# Patient Record
Sex: FEMALE | Race: WHITE | NOT HISPANIC OR LATINO | URBAN - METROPOLITAN AREA
[De-identification: names, ages, dates, MRNs, and addresses within clinical notes are randomized per-mention and may not be internally consistent; named-entity substitution may affect disease eponyms.]

---

## 2021-09-27 ENCOUNTER — INPATIENT (INPATIENT)
Facility: HOSPITAL | Age: 56
LOS: 5 days | Discharge: HOME | DRG: 189 | End: 2021-10-03
Attending: HOSPITALIST | Admitting: HOSPITALIST
Payer: COMMERCIAL

## 2021-09-27 VITALS
SYSTOLIC BLOOD PRESSURE: 116 MMHG | TEMPERATURE: 99 F | OXYGEN SATURATION: 98 % | HEART RATE: 94 BPM | RESPIRATION RATE: 18 BRPM | DIASTOLIC BLOOD PRESSURE: 74 MMHG

## 2021-09-27 DIAGNOSIS — F41.9 ANXIETY DISORDER, UNSPECIFIED: ICD-10-CM

## 2021-09-27 DIAGNOSIS — J45.909 UNSPECIFIED ASTHMA, UNCOMPLICATED: ICD-10-CM

## 2021-09-27 LAB
ALBUMIN SERPL ELPH-MCNC: 3.8 G/DL — SIGNIFICANT CHANGE UP (ref 3.4–5)
ALP SERPL-CCNC: 83 U/L — SIGNIFICANT CHANGE UP (ref 40–120)
ALT FLD-CCNC: 37 U/L — SIGNIFICANT CHANGE UP (ref 12–42)
ANION GAP SERPL CALC-SCNC: 15 MMOL/L — SIGNIFICANT CHANGE UP (ref 9–16)
AST SERPL-CCNC: 32 U/L — SIGNIFICANT CHANGE UP (ref 15–37)
BASOPHILS # BLD AUTO: 0 K/UL — SIGNIFICANT CHANGE UP (ref 0–0.2)
BASOPHILS NFR BLD AUTO: 0 % — SIGNIFICANT CHANGE UP (ref 0–2)
BILIRUB SERPL-MCNC: 0.3 MG/DL — SIGNIFICANT CHANGE UP (ref 0.2–1.2)
BUN SERPL-MCNC: 16 MG/DL — SIGNIFICANT CHANGE UP (ref 7–23)
CALCIUM SERPL-MCNC: 9.1 MG/DL — SIGNIFICANT CHANGE UP (ref 8.5–10.5)
CHLORIDE SERPL-SCNC: 104 MMOL/L — SIGNIFICANT CHANGE UP (ref 96–108)
CO2 SERPL-SCNC: 22 MMOL/L — SIGNIFICANT CHANGE UP (ref 22–31)
CREAT SERPL-MCNC: 0.9 MG/DL — SIGNIFICANT CHANGE UP (ref 0.5–1.3)
EOSINOPHIL # BLD AUTO: 0 K/UL — SIGNIFICANT CHANGE UP (ref 0–0.5)
EOSINOPHIL NFR BLD AUTO: 0 % — SIGNIFICANT CHANGE UP (ref 0–6)
GLUCOSE BLDC GLUCOMTR-MCNC: 118 MG/DL — HIGH (ref 70–99)
GLUCOSE SERPL-MCNC: 204 MG/DL — HIGH (ref 70–99)
HCT VFR BLD CALC: 40 % — SIGNIFICANT CHANGE UP (ref 34.5–45)
HGB BLD-MCNC: 12.8 G/DL — SIGNIFICANT CHANGE UP (ref 11.5–15.5)
LYMPHOCYTES # BLD AUTO: 0.13 K/UL — LOW (ref 1–3.3)
LYMPHOCYTES # BLD AUTO: 1 % — LOW (ref 13–44)
MANUAL SMEAR VERIFICATION: SIGNIFICANT CHANGE UP
MCHC RBC-ENTMCNC: 28.8 PG — SIGNIFICANT CHANGE UP (ref 27–34)
MCHC RBC-ENTMCNC: 32 GM/DL — SIGNIFICANT CHANGE UP (ref 32–36)
MCV RBC AUTO: 90.1 FL — SIGNIFICANT CHANGE UP (ref 80–100)
MONOCYTES # BLD AUTO: 0.26 K/UL — SIGNIFICANT CHANGE UP (ref 0–0.9)
MONOCYTES NFR BLD AUTO: 2 % — SIGNIFICANT CHANGE UP (ref 2–14)
NEUTROPHILS # BLD AUTO: 12.66 K/UL — HIGH (ref 1.8–7.4)
NEUTROPHILS NFR BLD AUTO: 93 % — HIGH (ref 43–77)
NEUTS BAND # BLD: 4 % — SIGNIFICANT CHANGE UP (ref 0–8)
NRBC # BLD: 0 /100 — SIGNIFICANT CHANGE UP (ref 0–0)
NRBC # BLD: SIGNIFICANT CHANGE UP /100 WBCS (ref 0–0)
PLAT MORPH BLD: NORMAL — SIGNIFICANT CHANGE UP
PLATELET # BLD AUTO: 323 K/UL — SIGNIFICANT CHANGE UP (ref 150–400)
POTASSIUM SERPL-MCNC: 3.8 MMOL/L — SIGNIFICANT CHANGE UP (ref 3.5–5.3)
POTASSIUM SERPL-SCNC: 3.8 MMOL/L — SIGNIFICANT CHANGE UP (ref 3.5–5.3)
PROT SERPL-MCNC: 7.6 G/DL — SIGNIFICANT CHANGE UP (ref 6.4–8.2)
RBC # BLD: 4.44 M/UL — SIGNIFICANT CHANGE UP (ref 3.8–5.2)
RBC # FLD: 13.8 % — SIGNIFICANT CHANGE UP (ref 10.3–14.5)
RBC BLD AUTO: NORMAL — SIGNIFICANT CHANGE UP
SARS-COV-2 RNA SPEC QL NAA+PROBE: SIGNIFICANT CHANGE UP
SODIUM SERPL-SCNC: 141 MMOL/L — SIGNIFICANT CHANGE UP (ref 132–145)
WBC # BLD: 13.05 K/UL — HIGH (ref 3.8–10.5)
WBC # FLD AUTO: 13.05 K/UL — HIGH (ref 3.8–10.5)

## 2021-09-27 PROCEDURE — 99053 MED SERV 10PM-8AM 24 HR FAC: CPT

## 2021-09-27 PROCEDURE — 99285 EMERGENCY DEPT VISIT HI MDM: CPT

## 2021-09-27 PROCEDURE — 99222 1ST HOSP IP/OBS MODERATE 55: CPT | Mod: GC

## 2021-09-27 PROCEDURE — 71045 X-RAY EXAM CHEST 1 VIEW: CPT | Mod: 26

## 2021-09-27 RX ORDER — INFLUENZA VIRUS VACCINE 15; 15; 15; 15 UG/.5ML; UG/.5ML; UG/.5ML; UG/.5ML
0.5 SUSPENSION INTRAMUSCULAR ONCE
Refills: 0 | Status: DISCONTINUED | OUTPATIENT
Start: 2021-09-27 | End: 2021-10-03

## 2021-09-27 RX ORDER — ALBUTEROL 90 UG/1
2.5 AEROSOL, METERED ORAL
Refills: 0 | Status: COMPLETED | OUTPATIENT
Start: 2021-09-27 | End: 2021-09-27

## 2021-09-27 RX ORDER — SODIUM CHLORIDE 9 MG/ML
1000 INJECTION, SOLUTION INTRAVENOUS
Refills: 0 | Status: DISCONTINUED | OUTPATIENT
Start: 2021-09-27 | End: 2021-10-03

## 2021-09-27 RX ORDER — GLUCAGON INJECTION, SOLUTION 0.5 MG/.1ML
1 INJECTION, SOLUTION SUBCUTANEOUS ONCE
Refills: 0 | Status: DISCONTINUED | OUTPATIENT
Start: 2021-09-27 | End: 2021-10-03

## 2021-09-27 RX ORDER — IPRATROPIUM/ALBUTEROL SULFATE 18-103MCG
3 AEROSOL WITH ADAPTER (GRAM) INHALATION EVERY 4 HOURS
Refills: 0 | Status: DISCONTINUED | OUTPATIENT
Start: 2021-09-27 | End: 2021-09-29

## 2021-09-27 RX ORDER — IPRATROPIUM/ALBUTEROL SULFATE 18-103MCG
3 AEROSOL WITH ADAPTER (GRAM) INHALATION ONCE
Refills: 0 | Status: COMPLETED | OUTPATIENT
Start: 2021-09-27 | End: 2021-09-27

## 2021-09-27 RX ORDER — LANOLIN ALCOHOL/MO/W.PET/CERES
3 CREAM (GRAM) TOPICAL AT BEDTIME
Refills: 0 | Status: DISCONTINUED | OUTPATIENT
Start: 2021-09-27 | End: 2021-10-03

## 2021-09-27 RX ORDER — BUDESONIDE AND FORMOTEROL FUMARATE DIHYDRATE 160; 4.5 UG/1; UG/1
2 AEROSOL RESPIRATORY (INHALATION)
Refills: 0 | Status: DISCONTINUED | OUTPATIENT
Start: 2021-09-27 | End: 2021-09-28

## 2021-09-27 RX ORDER — DEXTROSE 50 % IN WATER 50 %
12.5 SYRINGE (ML) INTRAVENOUS ONCE
Refills: 0 | Status: DISCONTINUED | OUTPATIENT
Start: 2021-09-27 | End: 2021-10-03

## 2021-09-27 RX ORDER — ENOXAPARIN SODIUM 100 MG/ML
40 INJECTION SUBCUTANEOUS EVERY 24 HOURS
Refills: 0 | Status: DISCONTINUED | OUTPATIENT
Start: 2021-09-27 | End: 2021-10-03

## 2021-09-27 RX ORDER — DEXTROSE 50 % IN WATER 50 %
25 SYRINGE (ML) INTRAVENOUS ONCE
Refills: 0 | Status: DISCONTINUED | OUTPATIENT
Start: 2021-09-27 | End: 2021-10-03

## 2021-09-27 RX ORDER — POLYETHYLENE GLYCOL 3350 17 G/17G
17 POWDER, FOR SOLUTION ORAL EVERY 24 HOURS
Refills: 0 | Status: DISCONTINUED | OUTPATIENT
Start: 2021-09-27 | End: 2021-09-30

## 2021-09-27 RX ORDER — DEXTROSE 50 % IN WATER 50 %
15 SYRINGE (ML) INTRAVENOUS ONCE
Refills: 0 | Status: DISCONTINUED | OUTPATIENT
Start: 2021-09-27 | End: 2021-10-03

## 2021-09-27 RX ORDER — ACETAMINOPHEN 500 MG
650 TABLET ORAL EVERY 6 HOURS
Refills: 0 | Status: DISCONTINUED | OUTPATIENT
Start: 2021-09-27 | End: 2021-10-03

## 2021-09-27 RX ORDER — IPRATROPIUM/ALBUTEROL SULFATE 18-103MCG
3 AEROSOL WITH ADAPTER (GRAM) INHALATION EVERY 6 HOURS
Refills: 0 | Status: DISCONTINUED | OUTPATIENT
Start: 2021-09-27 | End: 2021-09-27

## 2021-09-27 RX ORDER — INSULIN LISPRO 100/ML
VIAL (ML) SUBCUTANEOUS
Refills: 0 | Status: DISCONTINUED | OUTPATIENT
Start: 2021-09-27 | End: 2021-10-03

## 2021-09-27 RX ORDER — SENNA PLUS 8.6 MG/1
2 TABLET ORAL AT BEDTIME
Refills: 0 | Status: DISCONTINUED | OUTPATIENT
Start: 2021-09-27 | End: 2021-09-30

## 2021-09-27 RX ADMIN — ALBUTEROL 2.5 MILLIGRAM(S): 90 AEROSOL, METERED ORAL at 03:08

## 2021-09-27 RX ADMIN — ENOXAPARIN SODIUM 40 MILLIGRAM(S): 100 INJECTION SUBCUTANEOUS at 14:28

## 2021-09-27 RX ADMIN — Medication 125 MILLIGRAM(S): at 07:09

## 2021-09-27 RX ADMIN — Medication 5 MILLIGRAM(S): at 14:29

## 2021-09-27 RX ADMIN — ALBUTEROL 2.5 MILLIGRAM(S): 90 AEROSOL, METERED ORAL at 03:49

## 2021-09-27 RX ADMIN — Medication 3 MILLILITER(S): at 17:48

## 2021-09-27 RX ADMIN — ALBUTEROL 2.5 MILLIGRAM(S): 90 AEROSOL, METERED ORAL at 07:09

## 2021-09-27 RX ADMIN — Medication 3 MILLILITER(S): at 14:02

## 2021-09-27 RX ADMIN — ALBUTEROL 2.5 MILLIGRAM(S): 90 AEROSOL, METERED ORAL at 04:29

## 2021-09-27 RX ADMIN — Medication 3 MILLILITER(S): at 22:25

## 2021-09-27 RX ADMIN — Medication 60 MILLIGRAM(S): at 17:48

## 2021-09-27 RX ADMIN — Medication 3 MILLILITER(S): at 10:22

## 2021-09-27 RX ADMIN — ALBUTEROL 2.5 MILLIGRAM(S): 90 AEROSOL, METERED ORAL at 01:15

## 2021-09-27 RX ADMIN — ALBUTEROL 2.5 MILLIGRAM(S): 90 AEROSOL, METERED ORAL at 02:56

## 2021-09-27 RX ADMIN — BUDESONIDE AND FORMOTEROL FUMARATE DIHYDRATE 2 PUFF(S): 160; 4.5 AEROSOL RESPIRATORY (INHALATION) at 17:48

## 2021-09-27 RX ADMIN — Medication 3 MILLIGRAM(S): at 22:25

## 2021-09-27 RX ADMIN — ALBUTEROL 2.5 MILLIGRAM(S): 90 AEROSOL, METERED ORAL at 02:54

## 2021-09-27 RX ADMIN — ALBUTEROL 2.5 MILLIGRAM(S): 90 AEROSOL, METERED ORAL at 06:56

## 2021-09-27 RX ADMIN — ALBUTEROL 2.5 MILLIGRAM(S): 90 AEROSOL, METERED ORAL at 05:40

## 2021-09-27 NOTE — H&P ADULT - PROBLEM SELECTOR PLAN 1
One week hx of SOB and cough in the setting of recent exposure to feathers. Pt endorses most of her asthma is exacerbated by allergies. Asthma since childhood. Last hospitalization for asthma 10 years ago w/ no hx intubation. Home meds albuterol nebs, advair 500  - Prednisone 40mg for 5 days   - c/w advair 500   - c/w duoneb q6hr  - currently on 5L NC sat 96%  - continue to monitor respiratory status One week hx of SOB and cough in the setting of recent exposure to feathers. Pt endorses most of her asthma is exacerbated by allergies. Asthma since childhood. Last hospitalization for asthma 10 years ago w/ no hx intubation. Home meds albuterol nebs, advair 500.   - Prednisone 40mg for 5 days   - c/w symbicort    - c/w duoneb q6hr  - currently on 5L NC sat 96%  - continue to monitor respiratory status One week hx of SOB and cough in the setting of recent exposure to feathers. Pt endorses most of her asthma is exacerbated by allergies. Asthma since childhood. Last hospitalization for asthma 10 years ago w/ no hx intubation. Home meds albuterol roque advair 500.   - Prednisone 60mg for 5 days   - c/w symbicort    - c/w duoneb q4hr  - currently on 5L NC sat 96%  - continue to monitor respiratory status

## 2021-09-27 NOTE — ED ADULT TRIAGE NOTE - CHIEF COMPLAINT QUOTE
pt presents with shortness of breath, progressively worse over the past several days. given epi, 2 combivents, 12mg of dex, and 2mg mag en route. pt wheezing on arrival.

## 2021-09-27 NOTE — ED ADULT NURSE REASSESSMENT NOTE - NS ED NURSE REASSESS COMMENT FT1
called [facility] to give report. no answer. called ADN office and spoke to Fer who stated he will call the floor and leave a message telling them to call me for report.
pt remains wheezing s/p 3 albuterol tx, o2 sat 87% while at rest, Placed on 2L Nc , pt breathing unlabored and remains comfortable, RUTH Carlin made aware.
received pt from night shift rn. pt noted on stretcher awake, respirations even and unlabored. pt alert and oriented x3. no complaints at this time pt noted on NRB. switched pt over to 3L nasal canula and pt oxygen saturation 97%. lab work drawn and covid swab done, as per md orders. pt awaiting bed at [facility]. call bell in reach and remains on continuos pulse ox.
Ambulated with patient with SpO2 and O2 dropping to 92% on RA. Pt reports feeling shortness of breath. Pt assisted back into bed and given nebulizer treatment with resulting O2 saturation of 96%.

## 2021-09-27 NOTE — H&P ADULT - NSHPLABSRESULTS_GEN_ALL_CORE
.  LABS:                         12.8   13.05 )-----------( 323      ( 27 Sep 2021 08:24 )             40.0     09-27    141  |  104  |  16  ----------------------------<  204<H>  3.8   |  22  |  0.90    Ca    9.1      27 Sep 2021 08:24    TPro  7.6  /  Alb  3.8  /  TBili  0.3  /  DBili  x   /  AST  32  /  ALT  37  /  AlkPhos  83  09-27                  RADIOLOGY, EKG & ADDITIONAL TESTS: Reviewed.

## 2021-09-27 NOTE — H&P ADULT - NSHPPHYSICALEXAM_GEN_ALL_CORE
.  VITAL SIGNS:  T(C): 36.9 (09-27-21 @ 09:36), Max: 37.2 (09-27-21 @ 00:26)  T(F): 98.5 (09-27-21 @ 09:36), Max: 98.9 (09-27-21 @ 00:26)  HR: 116 (09-27-21 @ 12:10) (94 - 126)  BP: 124/58 (09-27-21 @ 12:10) (108/62 - 124/58)  BP(mean): 83 (09-27-21 @ 12:10) (83 - 83)  RR: 20 (09-27-21 @ 12:10) (18 - 22)  SpO2: 96% (09-27-21 @ 12:10) (92% - 98%)  Wt(kg): --    PHYSICAL EXAM:    Constitutional: WDWN resting comfortably in bed; NAD  Head: NC/AT  Eyes: PERRL, EOMI, anicteric sclera  ENT: no nasal discharge; uvula midline, no oropharyngeal erythema or exudates; MMM  Neck: supple; no JVD or thyromegaly  Respiratory: diffuse wheezing B/L; no W/R/R, no retractions  Cardiac: +S1/S2; RRR; no M/R/G; PMI non-displaced  Gastrointestinal: soft, NT/ND; +BSx4  Extremities: WWP, no clubbing or cyanosis; no peripheral edema  Vascular: 2+ radial, DP/PT pulses B/L  Dermatologic: skin warm, dry and intact;  Neurologic: AAOx3; moving all extremities, follows commands

## 2021-09-27 NOTE — H&P ADULT - HISTORY OF PRESENT ILLNESS
Pt is a 56 year old female with a past medical history of asthma (last hospitalization 10 years ago, no hx if intubation) and anxiety, presenting with an 7 day hx of shortness of breath with associated cough. Pt reports her asthma triggered by allergies and that 8 days ago she traveled for work and slept at a hotel with feather pillows. Since then she has had shortness of breath with assc dry cough Pt was taking advair as usual and her duoneb up to 5 times a day with no relief. Pt finally came to ED because the shortness of breath was so bad she felt like was going to pass out.  Pt is a 56 year old female with a past medical history of asthma (last hospitalization 10 years ago, no hx if intubation) and anxiety, presenting with an 7 day hx of shortness of breath with associated cough. Pt reports her asthma triggered by allergies and that 8 days ago she traveled for work and slept at a hotel with feather pillows. Since then she has had shortness of breath with assc dry cough Pt was taking advair as usual and her duoneb up to 5 times a day with no relief. Pt finally came to ED because the shortness of breath was so bad she felt like was going to pass out.     ED course  Vitals:  Labs:  Imaging:  Interventions:  Pt is a 56 year old female with a past medical history of asthma (last hospitalization 10 years ago, no hx if intubation) and anxiety, presenting with an 7 day hx of shortness of breath with associated cough. Pt reports her asthma triggered by allergies and that 8 days ago she traveled for work and slept at a hotel with feather pillows. Since then she has had shortness of breath with assc dry cough Pt was taking advair as usual and her duoneb up to 5 times a day with no relief. Pt finally came to ED because the shortness of breath was so bad she felt like was going to pass out.     ED course  Vitals: 98.5F, , 123/55, RR 20, 96% on 3LNC  Labs: WBC 13, glucose 204, covid negative  Imaging: CXR revealing hyperinflation   Interventions: Albuterol duoneb  Pt is a 56 year old female with a past medical history of asthma (last hospitalization 10 years ago, no hx if intubation) and anxiety, presenting with an 7 day hx of shortness of breath with associated cough. Pt reports her asthma triggered by allergies and that 8 days ago she traveled for work and slept at a hotel with feather pillows. Since then she has had shortness of breath with assc dry cough Pt was taking advair as usual and her duoneb up to 5 times a day with no relief. Pt finally came to ED because the shortness of breath was so bad she felt like was going to pass out.     ED course  Vitals: 98.5F, , 123/55, RR 20, 96% on 3LNC  Labs: WBC 13, glucose 204, covid negative  Imaging: CXR revealing hyperinflation   Interventions: Albuterol duoneb x9, Solumedrol x1

## 2021-09-27 NOTE — ED ADULT NURSE NOTE - OBJECTIVE STATEMENT
Several days of asthma attack, has used home inhaler and home nebulizer without relief. Wheezing throughout, speaking in long phrases. No history of intubations, no recent hospitalizations. Likely trigger ragweed. Given steroids, nebs, mag and epi by EMS prior to arrival.

## 2021-09-27 NOTE — H&P ADULT - ATTENDING COMMENTS
Patient with acute hypoxemic respiratory failure from asthma with severe wheezing on NC. Repeat prednisone this PM 60 mg with q4h bronchodilators, tele and oximetry monitoring.

## 2021-09-27 NOTE — ED PROVIDER NOTE - CLINICAL SUMMARY MEDICAL DECISION MAKING FREE TEXT BOX
Patient here with hx of asthma now here with worsening wheezing for the past several days.   received steroids,epi,mag via EMS  plans: roque

## 2021-09-27 NOTE — H&P ADULT - NSHPREVIEWOFSYSTEMS_GEN_ALL_CORE
REVIEW OF SYSTEMS:    CONSTITUTIONAL: No weakness, fevers or chills  EYES/ENT: No visual changes;  No throat pain  RESPIRATORY: No wheezing, hemoptysis; (+) shortness of breath, cough  CARDIOVASCULAR: No chest pain or palpitations  GASTROINTESTINAL: No abdominal . No nausea, vomiting. No diarrhea or constipation.  GENITOURINARY: No dysuria  NEUROLOGICAL: No numbness or weakness  SKIN: No itching, rashes

## 2021-09-27 NOTE — ED PROVIDER NOTE - PROGRESS NOTE DETAILS
Patient contacted as reminder of upcoming appointment with Dr. Harvey on 7/19/18 with arrival time of 1330. Patient aware, no further questions at this time.   Patient walked and desat to low 90s. Decision made to admit patient to tele stepdown. Patient accepted to stepdown. Patient states feeling better after the treatement in ED but still feels difficulty breathing. Speaking full sentences and able to walk without assistance.

## 2021-09-27 NOTE — H&P ADULT - NSICDXFAMILYHX_GEN_ALL_CORE_FT
FAMILY HISTORY:  Father  Still living? Unknown  Family hx of hypertension, Age at diagnosis: Age Unknown    Mother  Still living? Unknown  Family history of asthma, Age at diagnosis: Age Unknown    Sibling  Still living? Unknown  Family hx of hypertension, Age at diagnosis: Age Unknown

## 2021-09-27 NOTE — ED PROVIDER NOTE - OBJECTIVE STATEMENT
57 y/o female hx of asthma now here for asthma exacerbation. patient states she has not been feeling well the last few days and her symptoms have been worsening. Patient did not come to ED initially because of insurance reasons. Denies fever, chills, hematuria, vaginal bleeding, d/c, abdominal pain, change in bowel function, flank pain, rash, HA, dizziness, CP, palpitations, diaphoresis, cough, and malaise.

## 2021-09-27 NOTE — ED PROVIDER NOTE - ATTENDING CONTRIBUTION TO CARE
Patient with status asthmaticus admit to stepdown seen with Cullen MIRANDA in room 11 [facility]

## 2021-09-28 LAB
A1C WITH ESTIMATED AVERAGE GLUCOSE RESULT: 5.6 % — SIGNIFICANT CHANGE UP (ref 4–5.6)
ANION GAP SERPL CALC-SCNC: 13 MMOL/L — SIGNIFICANT CHANGE UP (ref 5–17)
BUN SERPL-MCNC: 14 MG/DL — SIGNIFICANT CHANGE UP (ref 7–23)
CALCIUM SERPL-MCNC: 10.3 MG/DL — SIGNIFICANT CHANGE UP (ref 8.4–10.5)
CHLORIDE SERPL-SCNC: 102 MMOL/L — SIGNIFICANT CHANGE UP (ref 96–108)
CO2 SERPL-SCNC: 24 MMOL/L — SIGNIFICANT CHANGE UP (ref 22–31)
CREAT SERPL-MCNC: 0.57 MG/DL — SIGNIFICANT CHANGE UP (ref 0.5–1.3)
ESTIMATED AVERAGE GLUCOSE: 114 MG/DL — SIGNIFICANT CHANGE UP (ref 68–114)
GLUCOSE BLDC GLUCOMTR-MCNC: 110 MG/DL — HIGH (ref 70–99)
GLUCOSE BLDC GLUCOMTR-MCNC: 116 MG/DL — HIGH (ref 70–99)
GLUCOSE BLDC GLUCOMTR-MCNC: 125 MG/DL — HIGH (ref 70–99)
GLUCOSE BLDC GLUCOMTR-MCNC: 125 MG/DL — HIGH (ref 70–99)
GLUCOSE SERPL-MCNC: 121 MG/DL — HIGH (ref 70–99)
HCT VFR BLD CALC: 42.2 % — SIGNIFICANT CHANGE UP (ref 34.5–45)
HCV AB S/CO SERPL IA: 0.04 S/CO — SIGNIFICANT CHANGE UP
HCV AB SERPL-IMP: SIGNIFICANT CHANGE UP
HGB BLD-MCNC: 13.6 G/DL — SIGNIFICANT CHANGE UP (ref 11.5–15.5)
MAGNESIUM SERPL-MCNC: 2.2 MG/DL — SIGNIFICANT CHANGE UP (ref 1.6–2.6)
MCHC RBC-ENTMCNC: 28.8 PG — SIGNIFICANT CHANGE UP (ref 27–34)
MCHC RBC-ENTMCNC: 32.2 GM/DL — SIGNIFICANT CHANGE UP (ref 32–36)
MCV RBC AUTO: 89.4 FL — SIGNIFICANT CHANGE UP (ref 80–100)
NRBC # BLD: 0 /100 WBCS — SIGNIFICANT CHANGE UP (ref 0–0)
PHOSPHATE SERPL-MCNC: 2.8 MG/DL — SIGNIFICANT CHANGE UP (ref 2.5–4.5)
PLATELET # BLD AUTO: 375 K/UL — SIGNIFICANT CHANGE UP (ref 150–400)
POTASSIUM SERPL-MCNC: 4.1 MMOL/L — SIGNIFICANT CHANGE UP (ref 3.5–5.3)
POTASSIUM SERPL-SCNC: 4.1 MMOL/L — SIGNIFICANT CHANGE UP (ref 3.5–5.3)
RBC # BLD: 4.72 M/UL — SIGNIFICANT CHANGE UP (ref 3.8–5.2)
RBC # FLD: 14.4 % — SIGNIFICANT CHANGE UP (ref 10.3–14.5)
SODIUM SERPL-SCNC: 139 MMOL/L — SIGNIFICANT CHANGE UP (ref 135–145)
WBC # BLD: 29.42 K/UL — HIGH (ref 3.8–10.5)
WBC # FLD AUTO: 29.42 K/UL — HIGH (ref 3.8–10.5)

## 2021-09-28 PROCEDURE — 99233 SBSQ HOSP IP/OBS HIGH 50: CPT | Mod: GC

## 2021-09-28 RX ORDER — FLUTICASONE PROPIONATE AND SALMETEROL 50; 250 UG/1; UG/1
1 POWDER ORAL; RESPIRATORY (INHALATION)
Qty: 0 | Refills: 0 | DISCHARGE

## 2021-09-28 RX ORDER — ALBUTEROL 90 UG/1
2 AEROSOL, METERED ORAL
Qty: 0 | Refills: 0 | DISCHARGE

## 2021-09-28 RX ORDER — IPRATROPIUM/ALBUTEROL SULFATE 18-103MCG
3 AEROSOL WITH ADAPTER (GRAM) INHALATION ONCE
Refills: 0 | Status: DISCONTINUED | OUTPATIENT
Start: 2021-09-28 | End: 2021-09-28

## 2021-09-28 RX ADMIN — Medication 60 MILLIGRAM(S): at 18:28

## 2021-09-28 RX ADMIN — Medication 3 MILLILITER(S): at 13:12

## 2021-09-28 RX ADMIN — Medication 5 MILLIGRAM(S): at 12:15

## 2021-09-28 RX ADMIN — Medication 3 MILLILITER(S): at 06:21

## 2021-09-28 RX ADMIN — Medication 3 MILLILITER(S): at 02:35

## 2021-09-28 RX ADMIN — Medication 100 MILLIGRAM(S): at 22:18

## 2021-09-28 RX ADMIN — Medication 3 MILLILITER(S): at 17:05

## 2021-09-28 RX ADMIN — Medication 100 MILLIGRAM(S): at 09:44

## 2021-09-28 RX ADMIN — BUDESONIDE AND FORMOTEROL FUMARATE DIHYDRATE 2 PUFF(S): 160; 4.5 AEROSOL RESPIRATORY (INHALATION) at 06:22

## 2021-09-28 RX ADMIN — Medication 100 MILLIGRAM(S): at 17:05

## 2021-09-28 RX ADMIN — Medication 60 MILLIGRAM(S): at 07:00

## 2021-09-28 RX ADMIN — Medication 3 MILLILITER(S): at 22:18

## 2021-09-28 RX ADMIN — Medication 3 MILLILITER(S): at 10:43

## 2021-09-28 RX ADMIN — ENOXAPARIN SODIUM 40 MILLIGRAM(S): 100 INJECTION SUBCUTANEOUS at 12:13

## 2021-09-28 NOTE — PROGRESS NOTE ADULT - ASSESSMENT
Pt is a 56 year old female with a pmhx of asthma and anxiety presenting with a 7 day hx of SOB and dry cough likely in the setting of asthma exacerbation.

## 2021-09-28 NOTE — PROGRESS NOTE ADULT - SUBJECTIVE AND OBJECTIVE BOX
INTERVAL HPI/OVERNIGHT EVENTS:  As per night team, no overnight events. Patient seen and examined at bedside. Pt reports she is still feeling short of breath and now requires oxygen to get up to use the bathroom. Otherwise denies fever, chills, CP, N/V/D/C    VITALS  Vital Signs Last 24 Hrs  T(C): 36.2 (28 Sep 2021 13:35), Max: 37.4 (27 Sep 2021 17:36)  T(F): 97.2 (28 Sep 2021 13:35), Max: 99.4 (27 Sep 2021 17:36)  HR: 102 (28 Sep 2021 12:06) (86 - 108)  BP: 118/57 (28 Sep 2021 12:06) (100/49 - 126/60)  BP(mean): 81 (28 Sep 2021 12:06) (70 - 86)  RR: 18 (28 Sep 2021 12:06) (16 - 20)  SpO2: 96% (28 Sep 2021 12:06) (93% - 97%)    CAPILLARY BLOOD GLUCOSE      POCT Blood Glucose.: 125 mg/dL (28 Sep 2021 11:14)  POCT Blood Glucose.: 110 mg/dL (28 Sep 2021 06:35)  POCT Blood Glucose.: 118 mg/dL (27 Sep 2021 21:33)      PHYSICAL EXAM  General: NAD, sitting comfortably in bed, coughing intermittently   HEENT: PERRL/ EOMI, no scleral icterus  Respiratory: diffuse wheezing b/l, no crackles, no accessory muscle use  Cardiovascular: Regular rhythm/rate; +S1 +S2, no murmurs  Gastrointestinal: Soft, NTND, normoactive BS  Extremities: WWP, no cyanosis, no LE or UE edema, distal & radial pulses   Neurological: A&Ox3, moving all extremities, follows commands  Skin: Normal temperature, warm, dry      MEDICATIONS  (STANDING):  albuterol/ipratropium for Nebulization 3 milliLiter(s) Nebulizer every 4 hours  benzonatate 100 milliGRAM(s) Oral every 8 hours  budesonide 160 MICROgram(s)/formoterol 4.5 MICROgram(s) Inhaler 2 Puff(s) Inhalation two times a day  dextrose 40% Gel 15 Gram(s) Oral once  dextrose 5%. 1000 milliLiter(s) (50 mL/Hr) IV Continuous <Continuous>  dextrose 5%. 1000 milliLiter(s) (100 mL/Hr) IV Continuous <Continuous>  dextrose 50% Injectable 25 Gram(s) IV Push once  dextrose 50% Injectable 12.5 Gram(s) IV Push once  dextrose 50% Injectable 25 Gram(s) IV Push once  enoxaparin Injectable 40 milliGRAM(s) SubCutaneous every 24 hours  glucagon  Injectable 1 milliGRAM(s) IntraMuscular once  influenza   Vaccine 0.5 milliLiter(s) IntraMuscular once  insulin lispro (ADMELOG) corrective regimen sliding scale   SubCutaneous Before meals and at bedtime  PARoxetine 5 milliGRAM(s) Oral every 24 hours  polyethylene glycol 3350 17 Gram(s) Oral every 24 hours  predniSONE   Tablet 60 milliGRAM(s) Oral every 24 hours  senna 2 Tablet(s) Oral at bedtime    MEDICATIONS  (PRN):  acetaminophen   Tablet .. 650 milliGRAM(s) Oral every 6 hours PRN Temp greater or equal to 38.5C (101.3F), Mild Pain (1 - 3)  melatonin 3 milliGRAM(s) Oral at bedtime PRN Insomnia      No Known Allergies      LABS                        13.6   29.42 )-----------( 375      ( 28 Sep 2021 09:00 )             42.2     09-28    139  |  102  |  14  ----------------------------<  121<H>  4.1   |  24  |  0.57    Ca    10.3      28 Sep 2021 09:00  Phos  2.8     09-28  Mg     2.2     09-28    TPro  7.6  /  Alb  3.8  /  TBili  0.3  /  DBili  x   /  AST  32  /  ALT  37  /  AlkPhos  83  09-27              RADIOLOGY & ADDITIONAL TESTS: Reviewed     INTERVAL HPI/OVERNIGHT EVENTS:  As per night team, no overnight events. Patient seen and examined at bedside. Pt reports she is still feeling short of breath and now requires oxygen to get up to use the bathroom. Otherwise denies fever, chills, CP, N/V/D/C    VITALS  Vital Signs Last 24 Hrs  T(C): 36.2 (28 Sep 2021 13:35), Max: 37.4 (27 Sep 2021 17:36)  T(F): 97.2 (28 Sep 2021 13:35), Max: 99.4 (27 Sep 2021 17:36)  HR: 102 (28 Sep 2021 12:06) (86 - 108)  BP: 118/57 (28 Sep 2021 12:06) (100/49 - 126/60)  BP(mean): 81 (28 Sep 2021 12:06) (70 - 86)  RR: 18 (28 Sep 2021 12:06) (16 - 20)  SpO2: 96% (28 Sep 2021 12:06) (93% - 97%)    CAPILLARY BLOOD GLUCOSE      POCT Blood Glucose.: 125 mg/dL (28 Sep 2021 11:14)  POCT Blood Glucose.: 110 mg/dL (28 Sep 2021 06:35)  POCT Blood Glucose.: 118 mg/dL (27 Sep 2021 21:33)      PHYSICAL EXAM  General: NAD, sitting comfortably in bed, coughing intermittently   HEENT: PERRL/ EOMI, no scleral icterus  Respiratory: diffuse wheezing b/l, no crackles, no accessory muscle use  Cardiovascular: Regular rhythm/rate; +S1 +S2, no murmurs  Gastrointestinal: Soft, NTND, normoactive BS  Extremities: WWP, no cyanosis, no LE or UE edema,   Vasc: 2+ distal & radial pulses   Neurological: A&Ox3, moving all extremities, follows commands  Skin: Normal temperature, warm, dry  MSK: no joint swelling  Psych: affect appropriate      MEDICATIONS  (STANDING):  albuterol/ipratropium for Nebulization 3 milliLiter(s) Nebulizer every 4 hours  benzonatate 100 milliGRAM(s) Oral every 8 hours  budesonide 160 MICROgram(s)/formoterol 4.5 MICROgram(s) Inhaler 2 Puff(s) Inhalation two times a day  dextrose 40% Gel 15 Gram(s) Oral once  dextrose 5%. 1000 milliLiter(s) (50 mL/Hr) IV Continuous <Continuous>  dextrose 5%. 1000 milliLiter(s) (100 mL/Hr) IV Continuous <Continuous>  dextrose 50% Injectable 25 Gram(s) IV Push once  dextrose 50% Injectable 12.5 Gram(s) IV Push once  dextrose 50% Injectable 25 Gram(s) IV Push once  enoxaparin Injectable 40 milliGRAM(s) SubCutaneous every 24 hours  glucagon  Injectable 1 milliGRAM(s) IntraMuscular once  influenza   Vaccine 0.5 milliLiter(s) IntraMuscular once  insulin lispro (ADMELOG) corrective regimen sliding scale   SubCutaneous Before meals and at bedtime  PARoxetine 5 milliGRAM(s) Oral every 24 hours  polyethylene glycol 3350 17 Gram(s) Oral every 24 hours  predniSONE   Tablet 60 milliGRAM(s) Oral every 24 hours  senna 2 Tablet(s) Oral at bedtime    MEDICATIONS  (PRN):  acetaminophen   Tablet .. 650 milliGRAM(s) Oral every 6 hours PRN Temp greater or equal to 38.5C (101.3F), Mild Pain (1 - 3)  melatonin 3 milliGRAM(s) Oral at bedtime PRN Insomnia      No Known Allergies      LABS                        13.6   29.42 )-----------( 375      ( 28 Sep 2021 09:00 )             42.2     09-28    139  |  102  |  14  ----------------------------<  121<H>  4.1   |  24  |  0.57    Ca    10.3      28 Sep 2021 09:00  Phos  2.8     09-28  Mg     2.2     09-28    TPro  7.6  /  Alb  3.8  /  TBili  0.3  /  DBili  x   /  AST  32  /  ALT  37  /  AlkPhos  83  09-27              RADIOLOGY & ADDITIONAL TESTS: Reviewed

## 2021-09-28 NOTE — PROGRESS NOTE ADULT - PROBLEM SELECTOR PLAN 1
One week hx of SOB and cough in the setting of recent exposure to feathers. Pt endorses most of her asthma is exacerbated by allergies. Asthma since childhood. Last hospitalization for asthma 10 years ago w/ no hx intubation. Home meds albuterol nebs, advair 500.   - Prednisone 60mg for 5 days   - c/w duoneb q4hr  - currently on 4L NC sat 96%  - continue to monitor respiratory status

## 2021-09-29 DIAGNOSIS — R63.8 OTHER SYMPTOMS AND SIGNS CONCERNING FOOD AND FLUID INTAKE: ICD-10-CM

## 2021-09-29 LAB
ANION GAP SERPL CALC-SCNC: 13 MMOL/L — SIGNIFICANT CHANGE UP (ref 5–17)
BASOPHILS # BLD AUTO: 0.03 K/UL — SIGNIFICANT CHANGE UP (ref 0–0.2)
BASOPHILS NFR BLD AUTO: 0.2 % — SIGNIFICANT CHANGE UP (ref 0–2)
BUN SERPL-MCNC: 15 MG/DL — SIGNIFICANT CHANGE UP (ref 7–23)
CALCIUM SERPL-MCNC: 10.4 MG/DL — SIGNIFICANT CHANGE UP (ref 8.4–10.5)
CHLORIDE SERPL-SCNC: 100 MMOL/L — SIGNIFICANT CHANGE UP (ref 96–108)
CO2 SERPL-SCNC: 23 MMOL/L — SIGNIFICANT CHANGE UP (ref 22–31)
CREAT SERPL-MCNC: 0.58 MG/DL — SIGNIFICANT CHANGE UP (ref 0.5–1.3)
EOSINOPHIL # BLD AUTO: 0 K/UL — SIGNIFICANT CHANGE UP (ref 0–0.5)
EOSINOPHIL NFR BLD AUTO: 0 % — SIGNIFICANT CHANGE UP (ref 0–6)
GLUCOSE BLDC GLUCOMTR-MCNC: 109 MG/DL — HIGH (ref 70–99)
GLUCOSE BLDC GLUCOMTR-MCNC: 140 MG/DL — HIGH (ref 70–99)
GLUCOSE BLDC GLUCOMTR-MCNC: 89 MG/DL — SIGNIFICANT CHANGE UP (ref 70–99)
GLUCOSE BLDC GLUCOMTR-MCNC: 98 MG/DL — SIGNIFICANT CHANGE UP (ref 70–99)
GLUCOSE SERPL-MCNC: 102 MG/DL — HIGH (ref 70–99)
HCT VFR BLD CALC: 40.5 % — SIGNIFICANT CHANGE UP (ref 34.5–45)
HGB BLD-MCNC: 13 G/DL — SIGNIFICANT CHANGE UP (ref 11.5–15.5)
IMM GRANULOCYTES NFR BLD AUTO: 0.4 % — SIGNIFICANT CHANGE UP (ref 0–1.5)
LYMPHOCYTES # BLD AUTO: 12.6 % — LOW (ref 13–44)
LYMPHOCYTES # BLD AUTO: 2.35 K/UL — SIGNIFICANT CHANGE UP (ref 1–3.3)
MAGNESIUM SERPL-MCNC: 2.2 MG/DL — SIGNIFICANT CHANGE UP (ref 1.6–2.6)
MCHC RBC-ENTMCNC: 28.5 PG — SIGNIFICANT CHANGE UP (ref 27–34)
MCHC RBC-ENTMCNC: 32.1 GM/DL — SIGNIFICANT CHANGE UP (ref 32–36)
MCV RBC AUTO: 88.8 FL — SIGNIFICANT CHANGE UP (ref 80–100)
MONOCYTES # BLD AUTO: 1.22 K/UL — HIGH (ref 0–0.9)
MONOCYTES NFR BLD AUTO: 6.6 % — SIGNIFICANT CHANGE UP (ref 2–14)
NEUTROPHILS # BLD AUTO: 14.91 K/UL — HIGH (ref 1.8–7.4)
NEUTROPHILS NFR BLD AUTO: 80.2 % — HIGH (ref 43–77)
NRBC # BLD: 0 /100 WBCS — SIGNIFICANT CHANGE UP (ref 0–0)
PHOSPHATE SERPL-MCNC: 4.1 MG/DL — SIGNIFICANT CHANGE UP (ref 2.5–4.5)
PLATELET # BLD AUTO: 355 K/UL — SIGNIFICANT CHANGE UP (ref 150–400)
POTASSIUM SERPL-MCNC: 3.8 MMOL/L — SIGNIFICANT CHANGE UP (ref 3.5–5.3)
POTASSIUM SERPL-SCNC: 3.8 MMOL/L — SIGNIFICANT CHANGE UP (ref 3.5–5.3)
RBC # BLD: 4.56 M/UL — SIGNIFICANT CHANGE UP (ref 3.8–5.2)
RBC # FLD: 14.4 % — SIGNIFICANT CHANGE UP (ref 10.3–14.5)
SODIUM SERPL-SCNC: 136 MMOL/L — SIGNIFICANT CHANGE UP (ref 135–145)
WBC # BLD: 17.72 K/UL — HIGH (ref 3.8–10.5)
WBC # FLD AUTO: 17.72 K/UL — HIGH (ref 3.8–10.5)

## 2021-09-29 PROCEDURE — 99232 SBSQ HOSP IP/OBS MODERATE 35: CPT | Mod: GC

## 2021-09-29 RX ORDER — IPRATROPIUM/ALBUTEROL SULFATE 18-103MCG
3 AEROSOL WITH ADAPTER (GRAM) INHALATION EVERY 6 HOURS
Refills: 0 | Status: DISCONTINUED | OUTPATIENT
Start: 2021-09-29 | End: 2021-09-30

## 2021-09-29 RX ADMIN — Medication 3 MILLILITER(S): at 02:00

## 2021-09-29 RX ADMIN — ENOXAPARIN SODIUM 40 MILLIGRAM(S): 100 INJECTION SUBCUTANEOUS at 13:50

## 2021-09-29 RX ADMIN — Medication 3 MILLILITER(S): at 09:30

## 2021-09-29 RX ADMIN — Medication 100 MILLIGRAM(S): at 21:18

## 2021-09-29 RX ADMIN — Medication 60 MILLIGRAM(S): at 06:58

## 2021-09-29 RX ADMIN — Medication 5 MILLIGRAM(S): at 13:51

## 2021-09-29 RX ADMIN — Medication 100 MILLIGRAM(S): at 07:59

## 2021-09-29 RX ADMIN — Medication 3 MILLILITER(S): at 18:45

## 2021-09-29 RX ADMIN — Medication 100 MILLIGRAM(S): at 13:51

## 2021-09-29 RX ADMIN — Medication 3 MILLILITER(S): at 06:58

## 2021-09-29 NOTE — PROGRESS NOTE ADULT - ASSESSMENT
56 year old female with a pmhx of asthma and anxiety presenting with a 7 day hx of SOB and dry cough likely in the setting of asthma exacerbation.

## 2021-09-29 NOTE — DISCHARGE NOTE PROVIDER - NSDCFUADDAPPT_GEN_ALL_CORE_FT
You have a primary care and pulmonology appointments scheduled for next week at [facility]. Please make sure to attend this appointments.

## 2021-09-29 NOTE — DISCHARGE NOTE PROVIDER - HOSPITAL COURSE
#Discharge: do not delete    Pt is a 56 year old female with a past medical history of asthma (last hospitalization 10 years ago, no hx if intubation) and anxiety, presented with a 7 day hx of SOB with cough, admitted for asthma exacerbation.    Hospital course (by problem):     #Asthma exacerbation  One week hx of SOB and cough in the setting of recent exposure to feathers, most likely asthma exacerbation. Patient reports h/o asthma since childhood with exacerbations triggered by allergies. Last exacerbation requiring hospitalization more than 10 years ago. Treated with solumedrol IV 125mg x1, prednisone 60mg BID for 2 days, then 60mg Qday starting 9/30 and duonebs Q4hrs, switched to Q6 hrs on 9/29. Patient also treated with oxygen via nasal cannula as required.    #Anxiety  -c/w home paroxetine 5 mg Qday    Patient was discharged to: Home  New medications:   Changes to old medications:  Medications that were stopped:  Items to follow up as outpatient:  Physical exam at the time of discharge:       #Discharge: do not delete    Pt is a 56 year old female with a past medical history of asthma (last hospitalization 10 years ago, no hx if intubation) and anxiety, presented with a 7 day hx of SOB with cough, admitted for asthma exacerbation.    Hospital course (by problem):     #Asthma exacerbation  One week hx of SOB and cough in the setting of recent exposure to feathers, most likely asthma exacerbation. Patient reports h/o asthma since childhood with exacerbations triggered by allergies. Last exacerbation requiring hospitalization more than 10 years ago. Treated with solumedrol IV 125mg x1, prednisone 60mg BID for 3 days, then 60mg Qday starting 10/1 and duonebs Q4hrs. Patient also treated with oxygen via nasal cannula as required. Pulm consult following starting 10/1, added azithromycin 500mg Qday for 3 days, Symbicort 80/4.5 BID, magnesium 2g IV x1.  On discharge continue with albuterol nebulizer as needed, Advair BID, Prednisone ____mg for ____days, Azithromycin for ___ more days to complete 3 days course, montelukast 10mg daily(???).    #Anxiety  -c/w home paroxetine 5 mg Qday    Patient was discharged to: Home  New medications:   Changes to old medications:  Medications that were stopped:  Items to follow up as outpatient:  Physical exam at the time of discharge:       #Discharge: do not delete    Pt is a 56 year old female with a past medical history of asthma (last hospitalization 10 years ago, no hx if intubation) and anxiety, presented with a 7 day hx of SOB with cough, admitted for asthma exacerbation.    Hospital course (by problem):     #Asthma exacerbation  One week hx of SOB and cough in the setting of recent exposure to feathers, most likely asthma exacerbation. Patient reports h/o asthma since childhood with exacerbations triggered by allergies. Last exacerbation requiring hospitalization more than 10 years ago. Treated with solumedrol IV 125mg x1, prednisone 60mg BID for 3 days, then 60mg Qday starting 10/1 and duonebs Q4hrs. Patient also treated with oxygen via nasal cannula as required, weaned to room air night before discharge. Pulm consult following starting 10/1, added azithromycin 500mg Qday for 3 days, Symbicort 80/4.5 BID, magnesium 2g IV x1, montelukast 10mg daily.  On discharge continue with albuterol nebulizer as needed; Advair BID; Prednisone 40 mg for 3 days, then 30mg for 3 days, then 10mg for 3 days; montelukast 10mg daily.    #Anxiety  -c/w home paroxetine 5 mg Qday    Patient was discharged to: Home  New medications: Prednisone 40 mg for 3 days, then 30mg for 3 days, then 10mg for 3 days; montelukast 10mg daily  Changes to old medications: None  Medications that were stopped: None  Items to follow up as outpatient: PFTs in pulm office  Physical exam at the time of discharge:   GENERAL: NAD, lying in bed comfortably  HEAD:  Atraumatic, Normocephalic  EYES: EOMI, PERRLA, conjunctiva and sclera clear  ENT: Moist mucous membranes  NECK: Supple, No JVD  CHEST/LUNG: +wheeze b/l lower lobes (improved since admission), No rales, rhonchi, or rubs. Unlabored respirations  HEART: Regular rate and rhythm; No murmurs, rubs, or gallops  ABDOMEN: BSx4; Soft, nontender, nondistended  EXTREMITIES:  2+ Peripheral Pulses, brisk capillary refill. No clubbing, cyanosis, or edema  NERVOUS SYSTEM:  A&Ox3, no focal deficits   SKIN: No rashes or lesions       #Discharge: do not delete    Pt is a 56 year old female with a past medical history of asthma (last hospitalization 10 years ago, no hx if intubation) and anxiety, presented with a 7 day hx of SOB with cough, admitted for asthma exacerbation.    Hospital course (by problem):     #Asthma exacerbation  One week hx of SOB and cough in the setting of recent exposure to feathers, most likely asthma exacerbation. Patient reports h/o asthma since childhood with exacerbations triggered by allergies. Last exacerbation requiring hospitalization more than 10 years ago. Treated with solumedrol IV 125mg x1, prednisone 60mg BID for 3 days, then 60mg Qday starting 10/1 and duonebs Q4hrs. Patient also treated with oxygen via nasal cannula as required, weaned to room air night before discharge. Pulm consult following starting 10/1, added azithromycin 500mg Qday for 3 days, Symbicort 80/4.5 BID, magnesium 2g IV x1, montelukast 10mg daily.  On discharge continue with albuterol nebulizer as needed; Advair BID; Prednisone 40 mg for 3 days, then 30mg for 3 days, then 20mg for 3 days, 10mg for 3 days; montelukast 10mg daily.    #Anxiety  -c/w home paroxetine 5 mg Qday    Patient was discharged to: Home  New medications: Prednisone 40 mg for 3 days, then 30mg for 3 days, then 10mg for 3 days; montelukast 10mg daily  Changes to old medications: None  Medications that were stopped: None  Items to follow up as outpatient: PFTs in pulm office  Physical exam at the time of discharge:   GENERAL: NAD, lying in bed comfortably  HEAD:  Atraumatic, Normocephalic  EYES: EOMI, PERRLA, conjunctiva and sclera clear  ENT: Moist mucous membranes  NECK: Supple, No JVD  CHEST/LUNG: +wheeze b/l lower lobes (improved since admission), No rales, rhonchi, or rubs. Unlabored respirations  HEART: Regular rate and rhythm; No murmurs, rubs, or gallops  ABDOMEN: BSx4; Soft, nontender, nondistended  EXTREMITIES:  2+ Peripheral Pulses, brisk capillary refill. No clubbing, cyanosis, or edema  NERVOUS SYSTEM:  A&Ox3, no focal deficits   SKIN: No rashes or lesions       #Discharge: do not delete    Pt is a 56 year old female with a past medical history of asthma (last hospitalization 10 years ago, no hx if intubation) and anxiety, presented with a 7 day hx of SOB with cough, admitted for asthma exacerbation.    Hospital course (by problem):     #Asthma exacerbation  One week hx of SOB and cough in the setting of recent exposure to feathers, most likely asthma exacerbation. Patient reports h/o asthma since childhood with exacerbations triggered by allergies. Last exacerbation requiring hospitalization more than 10 years ago. Treated with solumedrol IV 125mg x1, prednisone 60mg BID for 3 days, then 60mg Qday starting 10/1 and duonebs Q4hrs. Patient also treated with oxygen via nasal cannula as required, weaned to room air night before discharge. Pulm consult following starting 10/1, added azithromycin 500mg Qday for 3 days, Symbicort 80/4.5 BID, magnesium 2g IV x1, montelukast 10mg daily.  On discharge continue with albuterol nebulizer as needed; Advair BID; Prednisone 40 mg for 3 days, then 30mg for 3 days, then 20mg for 3 days, 10mg for 3 days; montelukast 10mg daily.    #Anxiety  -c/w home paroxetine 5 mg Qday    Patient was discharged to: Home  New medications: Prednisone 40 mg for 3 days, then 30mg for 3 days, then 10mg for 3 days; montelukast 10mg daily  Changes to old medications: None  Medications that were stopped: None  Items to follow up as outpatient: PFTs in pulm office  Physical exam at the time of discharge:   GENERAL: NAD, lying in bed comfortably  HEAD:  Atraumatic, Normocephalic  EYES: EOMI, PERRLA, conjunctiva and sclera clear  ENT: Moist mucous membranes  NECK: Supple, No JVD  CHEST/LUNG: +wheeze b/l lower lobes (improved since admission), No rales, rhonchi, or rubs. Unlabored respirations  HEART: Regular rate and rhythm; No murmurs, rubs, or gallops  ABDOMEN: BSx4; Soft, nontender, nondistended  EXTREMITIES:  2+ Peripheral Pulses, brisk capillary refill. No clubbing, cyanosis, or edema  NERVOUS SYSTEM:  A&Ox3, no focal deficits   SKIN: No rashes or lesions      ATTENDING ATTESTATION  Date of Service: 10/3/21    I interviewed and examined Noreen Palma on the day of discharge and greater than 30 minutes were spent by the team on processing their hospital discharge and coordinating their post-hospital care.     I discussed the care plan with the resident team. I agree with the discharge plan as outlined in the Discharge Summary. I have personally reviewed the above discharge summary and made changes where necessary, and as noted below.    Admitted for asthma exacerbation, likely with underlying COPD as well. Weaned to room air today. Stable for d/c with prolonged steroid taper. Needs outpatient pulm followup either with her existing pulmonologist or can set up with pulm here.    Khang Wood MD  Attending Hospitalist #Discharge: do not delete    Pt is a 56 year old female with a past medical history of asthma (last hospitalization 10 years ago, no hx if intubation) and anxiety, presented with a 7 day hx of SOB with cough, admitted for asthma exacerbation.    Hospital course (by problem):     #Asthma exacerbation, acute hypoxemic respiratory failure  One week hx of SOB and cough in the setting of recent exposure to feathers, most likely asthma exacerbation. Patient reports h/o asthma since childhood with exacerbations triggered by allergies. Last exacerbation requiring hospitalization more than 10 years ago. Treated with solumedrol IV 125mg x1, prednisone 60mg BID for 3 days, then 60mg Qday starting 10/1 and duonebs Q4hrs. Patient also treated with oxygen via nasal cannula as required, weaned to room air night before discharge. Pulm consult following starting 10/1, added azithromycin 500mg Qday for 3 days, Symbicort 80/4.5 BID, magnesium 2g IV x1, montelukast 10mg daily.  On discharge continue with albuterol nebulizer as needed; Advair BID; Prednisone 40 mg for 3 days, then 30mg for 3 days, then 20mg for 3 days, 10mg for 3 days; montelukast 10mg daily.    #Anxiety  -c/w home paroxetine 5 mg Qday    Patient was discharged to: Home  New medications: Prednisone 40 mg for 3 days, then 30mg for 3 days, then 10mg for 3 days; montelukast 10mg daily  Changes to old medications: None  Medications that were stopped: None  Items to follow up as outpatient: PFTs in pulm office  Physical exam at the time of discharge:   GENERAL: NAD, lying in bed comfortably  HEAD:  Atraumatic, Normocephalic  EYES: EOMI, PERRLA, conjunctiva and sclera clear  ENT: Moist mucous membranes  NECK: Supple, No JVD  CHEST/LUNG: +wheeze b/l lower lobes (improved since admission), No rales, rhonchi, or rubs. Unlabored respirations  HEART: Regular rate and rhythm; No murmurs, rubs, or gallops  ABDOMEN: BSx4; Soft, nontender, nondistended  EXTREMITIES:  2+ Peripheral Pulses, brisk capillary refill. No clubbing, cyanosis, or edema  NERVOUS SYSTEM:  A&Ox3, no focal deficits   SKIN: No rashes or lesions      ATTENDING ATTESTATION  Date of Service: 10/3/21    I interviewed and examined Noreen Palma on the day of discharge and greater than 30 minutes were spent by the team on processing their hospital discharge and coordinating their post-hospital care.     I discussed the care plan with the resident team. I agree with the discharge plan as outlined in the Discharge Summary. I have personally reviewed the above discharge summary and made changes where necessary, and as noted below.    Admitted for asthma exacerbation, likely with underlying COPD as well. Weaned to room air today. Stable for d/c with prolonged steroid taper. Needs outpatient pulm followup either with her existing pulmonologist or can set up with pulm here.    Khang Wood MD  Attending Hospitalist

## 2021-09-29 NOTE — DISCHARGE NOTE PROVIDER - CARE PROVIDER_API CALL
HIMANSHU THOMPSON  Internal Medicine  [address] [place] [id number]  Phone: [phone]  Fax: [phone]  Follow Up Time:

## 2021-09-29 NOTE — PROGRESS NOTE ADULT - SUBJECTIVE AND OBJECTIVE BOX
*INCOMPLETE NOTE*    Hospital course:    INTERVAL HPI/OVERNIGHT EVENTS:  As per night team, no overnight events. Patient seen and examined at bedside. Patient denies fever, chills, dizziness, weakness, HA, CP, SOB, N/V/D/C    VITALS  Vital Signs Last 24 Hrs  T(C): 36.4 (29 Sep 2021 10:14), Max: 37.2 (29 Sep 2021 01:21)  T(F): 97.5 (29 Sep 2021 10:14), Max: 98.9 (29 Sep 2021 01:21)  HR: 102 (29 Sep 2021 08:51) (86 - 108)  BP: 114/58 (29 Sep 2021 08:51) (113/53 - 131/61)  BP(mean): 82 (29 Sep 2021 08:51) (77 - 86)  RR: 16 (29 Sep 2021 08:51) (15 - 18)  SpO2: 92% (29 Sep 2021 08:51) (92% - 96%)    CAPILLARY BLOOD GLUCOSE      POCT Blood Glucose.: 109 mg/dL (29 Sep 2021 06:38)  POCT Blood Glucose.: 125 mg/dL (28 Sep 2021 22:15)  POCT Blood Glucose.: 116 mg/dL (28 Sep 2021 16:51)      PHYSICAL EXAM  General: NAD, sitting comfortably in bed, coughing intermittently   HEENT: PERRL/ EOMI, no scleral icterus  Respiratory: wheezing b/l, no crackles, no accessory muscle use  Cardiovascular: Regular rhythm/rate; +S1 +S2, no murmurs  Gastrointestinal: Soft, NTND, normoactive BS  Extremities: WWP, no cyanosis, no LE or UE edema,   Vasc: 2+ distal & radial pulses   Neurological: A&Ox3, moving all extremities, follows commands  Skin: Normal temperature, warm, dry  MSK: no joint swelling  Psych: affect appropriate      MEDICATIONS  (STANDING):  albuterol/ipratropium for Nebulization 3 milliLiter(s) Nebulizer every 4 hours  benzonatate 100 milliGRAM(s) Oral every 8 hours  dextrose 40% Gel 15 Gram(s) Oral once  dextrose 5%. 1000 milliLiter(s) (50 mL/Hr) IV Continuous <Continuous>  dextrose 5%. 1000 milliLiter(s) (100 mL/Hr) IV Continuous <Continuous>  dextrose 50% Injectable 25 Gram(s) IV Push once  dextrose 50% Injectable 12.5 Gram(s) IV Push once  dextrose 50% Injectable 25 Gram(s) IV Push once  enoxaparin Injectable 40 milliGRAM(s) SubCutaneous every 24 hours  glucagon  Injectable 1 milliGRAM(s) IntraMuscular once  influenza   Vaccine 0.5 milliLiter(s) IntraMuscular once  insulin lispro (ADMELOG) corrective regimen sliding scale   SubCutaneous Before meals and at bedtime  PARoxetine 5 milliGRAM(s) Oral every 24 hours  polyethylene glycol 3350 17 Gram(s) Oral every 24 hours  predniSONE   Tablet 60 milliGRAM(s) Oral every 12 hours  senna 2 Tablet(s) Oral at bedtime    MEDICATIONS  (PRN):  acetaminophen   Tablet .. 650 milliGRAM(s) Oral every 6 hours PRN Temp greater or equal to 38.5C (101.3F), Mild Pain (1 - 3)  melatonin 3 milliGRAM(s) Oral at bedtime PRN Insomnia      No Known Allergies      LABS                        13.0   17.72 )-----------( 355      ( 29 Sep 2021 08:05 )             40.5     09-29    136  |  100  |  15  ----------------------------<  102<H>  3.8   |  23  |  0.58    Ca    10.4      29 Sep 2021 08:05  Phos  4.1     09-29  Mg     2.2     09-29                RADIOLOGY & ADDITIONAL TESTS: Reviewed TRANSFER FROM [facility] TO [facility]  Hospital course: Pt is a 56 year old female with a past medical history of asthma (last hospitalization 10 years ago, no hx if intubation) and anxiety, presenting with an 7 day hx of shortness of breath with associated cough. Pt reports her asthma triggered by allergies and that 8 days ago she traveled for work and slept at a hotel with feather pillows. Since then she has had shortness of breath with assc dry cough Pt was taking advair as usual and her duoneb up to 5 times a day with no relief. Pt came to [facility] and was started on 5 day course prednisone 60mg BID with duonebs q4hr and was saturating 96% on 4LNC. Pts wheezing has minimized, and saturating 93-94% on RA however still using the NC intermittently for comfort. Pt stable for transfer for [facility].        VITALS  Vital Signs Last 24 Hrs  T(C): 36.4 (29 Sep 2021 10:14), Max: 37.2 (29 Sep 2021 01:21)  T(F): 97.5 (29 Sep 2021 10:14), Max: 98.9 (29 Sep 2021 01:21)  HR: 102 (29 Sep 2021 08:51) (86 - 108)  BP: 114/58 (29 Sep 2021 08:51) (113/53 - 131/61)  BP(mean): 82 (29 Sep 2021 08:51) (77 - 86)  RR: 16 (29 Sep 2021 08:51) (15 - 18)  SpO2: 92% (29 Sep 2021 08:51) (92% - 96%)    CAPILLARY BLOOD GLUCOSE      POCT Blood Glucose.: 109 mg/dL (29 Sep 2021 06:38)  POCT Blood Glucose.: 125 mg/dL (28 Sep 2021 22:15)  POCT Blood Glucose.: 116 mg/dL (28 Sep 2021 16:51)      PHYSICAL EXAM  General: NAD, sitting comfortably in bed  HEENT: PERRL/ EOMI, no scleral icterus  Respiratory: mild wheezing b/l, no crackles, no accessory muscle use  Cardiovascular: Regular rhythm/rate; +S1 +S2, no murmurs  Gastrointestinal: Soft, NTND, normoactive BS  Extremities: WWP, no cyanosis, no LE or UE edema,   Vasc: 2+ distal & radial pulses   Neurological: A&Ox3, moving all extremities, follows commands  Skin: Normal temperature, warm, dry  Psych: affect appropriate      MEDICATIONS  (STANDING):  albuterol/ipratropium for Nebulization 3 milliLiter(s) Nebulizer every 4 hours  benzonatate 100 milliGRAM(s) Oral every 8 hours  dextrose 40% Gel 15 Gram(s) Oral once  dextrose 5%. 1000 milliLiter(s) (50 mL/Hr) IV Continuous <Continuous>  dextrose 5%. 1000 milliLiter(s) (100 mL/Hr) IV Continuous <Continuous>  dextrose 50% Injectable 25 Gram(s) IV Push once  dextrose 50% Injectable 12.5 Gram(s) IV Push once  dextrose 50% Injectable 25 Gram(s) IV Push once  enoxaparin Injectable 40 milliGRAM(s) SubCutaneous every 24 hours  glucagon  Injectable 1 milliGRAM(s) IntraMuscular once  influenza   Vaccine 0.5 milliLiter(s) IntraMuscular once  insulin lispro (ADMELOG) corrective regimen sliding scale   SubCutaneous Before meals and at bedtime  PARoxetine 5 milliGRAM(s) Oral every 24 hours  polyethylene glycol 3350 17 Gram(s) Oral every 24 hours  predniSONE   Tablet 60 milliGRAM(s) Oral every 12 hours  senna 2 Tablet(s) Oral at bedtime    MEDICATIONS  (PRN):  acetaminophen   Tablet .. 650 milliGRAM(s) Oral every 6 hours PRN Temp greater or equal to 38.5C (101.3F), Mild Pain (1 - 3)  melatonin 3 milliGRAM(s) Oral at bedtime PRN Insomnia      No Known Allergies      LABS                        13.0   17.72 )-----------( 355      ( 29 Sep 2021 08:05 )             40.5     09-29    136  |  100  |  15  ----------------------------<  102<H>  3.8   |  23  |  0.58    Ca    10.4      29 Sep 2021 08:05  Phos  4.1     09-29  Mg     2.2     09-29                RADIOLOGY & ADDITIONAL TESTS: Reviewed

## 2021-09-29 NOTE — DISCHARGE NOTE PROVIDER - NSDCCPCAREPLAN_GEN_ALL_CORE_FT
PRINCIPAL DISCHARGE DIAGNOSIS  Diagnosis: Acute asthma exacerbation  Assessment and Plan of Treatment: You came to us with an asthma exacerbation, We treated you with nebulizer treatments, steroids, and oxygen and you improved.   Please continue to take:  Please return to the Emergency Department if you are feeling worsening shortness of breath.  Please follow up with your primary care provider.       PRINCIPAL DISCHARGE DIAGNOSIS  Diagnosis: Acute asthma exacerbation  Assessment and Plan of Treatment: You came to us with an asthma exacerbation, We treated you with nebulizer treatments, steroids, and oxygen and you improved.   Please continue to take:  Prednisone 40mg for 3 days, 30mg for 3 days, then 10mg for 3 day.  Montelukast 10mg daily  The rest of your medications as previously prescribed  Please return to the Emergency Department if you are feeling worsening shortness of breath.  Please follow up with your primary care provider and pulmonologist in 1 to 2 weeks.       PRINCIPAL DISCHARGE DIAGNOSIS  Diagnosis: Acute asthma exacerbation  Assessment and Plan of Treatment: You came to us with an asthma exacerbation, We treated you with nebulizer treatments, steroids, and oxygen and you improved.   Please continue to take:  Prednisone 40mg for 3 days, 30mg for 3 days, 20mg for 3 days, then 10mg for 3 days.  Montelukast 10mg daily  The rest of your medications as previously prescribed  Please return to the Emergency Department if you are feeling worsening shortness of breath.  Please follow up with your primary care provider and pulmonologist in 1 to 2 weeks.

## 2021-09-29 NOTE — PROGRESS NOTE ADULT - PROBLEM SELECTOR PLAN 3
Fluids: NI  Electrolytes: Mg>2, K>4  Nutrition:  No IVF currently needed, replete lytes PRN  Prophylaxis: lovenox  Activity: AAT, OOBTC  GI: none  C: FC  Dispo: Admit to [facility]

## 2021-09-29 NOTE — PROGRESS NOTE ADULT - PROBLEM SELECTOR PLAN 1
One week hx of SOB and cough in the setting of recent exposure to feathers. Pt endorses most of her asthma is exacerbated by allergies. Asthma since childhood. Last hospitalization for asthma 10 years ago w/ no hx intubation. Home meds albuterol nebs, advair 500.   - Prednisone 60mg for 5 days (today day 2)  - increase duoneb to q6hr  - currently sat 93-94% on RA but using NC intermittently for comfort  - continue to monitor respiratory status

## 2021-09-29 NOTE — DISCHARGE NOTE PROVIDER - NSDCMRMEDTOKEN_GEN_ALL_CORE_FT
Advair Diskus 500 mcg-50 mcg inhalation powder: 1 puff(s) inhaled 2 times a day  Albuterol (Eqv-ProAir HFA) 90 mcg/inh inhalation aerosol: 2 puff(s) inhaled every 6 hours, As Needed  PARoxetine 10 mg oral tablet: 1 tab(s) orally once a day   Advair Diskus 500 mcg-50 mcg inhalation powder: 1 puff(s) inhaled 2 times a day  Albuterol (Eqv-ProAir HFA) 90 mcg/inh inhalation aerosol: 2 puff(s) inhaled every 6 hours, As Needed  montelukast 10 mg oral tablet: 1 tab(s) orally once a day   PARoxetine 10 mg oral tablet: 1 tab(s) orally once a day  predniSONE 10 mg oral tablet: 4 tab(s) orally once a day x 3 days  3 tab(s) orally once a day x 3 days  1 tab(s) orally once a day x 3 days   Advair Diskus 500 mcg-50 mcg inhalation powder: 1 puff(s) inhaled 2 times a day  Albuterol (Eqv-ProAir HFA) 90 mcg/inh inhalation aerosol: 2 puff(s) inhaled every 6 hours, As Needed  montelukast 10 mg oral tablet: 1 tab(s) orally once a day   PARoxetine 10 mg oral tablet: 1 tab(s) orally once a day  predniSONE 10 mg oral tablet: 4 tab(s) orally once a day x 3 days  3 tab(s) orally once a day x 3 days  2 tab(s) orally once a day x 3 days  1 tab(s) orally once a day x 3 days

## 2021-09-30 DIAGNOSIS — J45.901 UNSPECIFIED ASTHMA WITH (ACUTE) EXACERBATION: ICD-10-CM

## 2021-09-30 LAB
ANION GAP SERPL CALC-SCNC: 9 MMOL/L — SIGNIFICANT CHANGE UP (ref 5–17)
BUN SERPL-MCNC: 14 MG/DL — SIGNIFICANT CHANGE UP (ref 7–23)
CALCIUM SERPL-MCNC: 9.8 MG/DL — SIGNIFICANT CHANGE UP (ref 8.4–10.5)
CHLORIDE SERPL-SCNC: 104 MMOL/L — SIGNIFICANT CHANGE UP (ref 96–108)
CO2 SERPL-SCNC: 26 MMOL/L — SIGNIFICANT CHANGE UP (ref 22–31)
CREAT SERPL-MCNC: 0.7 MG/DL — SIGNIFICANT CHANGE UP (ref 0.5–1.3)
GLUCOSE BLDC GLUCOMTR-MCNC: 104 MG/DL — HIGH (ref 70–99)
GLUCOSE BLDC GLUCOMTR-MCNC: 105 MG/DL — HIGH (ref 70–99)
GLUCOSE BLDC GLUCOMTR-MCNC: 95 MG/DL — SIGNIFICANT CHANGE UP (ref 70–99)
GLUCOSE BLDC GLUCOMTR-MCNC: 96 MG/DL — SIGNIFICANT CHANGE UP (ref 70–99)
GLUCOSE SERPL-MCNC: 103 MG/DL — HIGH (ref 70–99)
HCT VFR BLD CALC: 40.2 % — SIGNIFICANT CHANGE UP (ref 34.5–45)
HGB BLD-MCNC: 13.6 G/DL — SIGNIFICANT CHANGE UP (ref 11.5–15.5)
MAGNESIUM SERPL-MCNC: 2.1 MG/DL — SIGNIFICANT CHANGE UP (ref 1.6–2.6)
MCHC RBC-ENTMCNC: 30.2 PG — SIGNIFICANT CHANGE UP (ref 27–34)
MCHC RBC-ENTMCNC: 33.8 GM/DL — SIGNIFICANT CHANGE UP (ref 32–36)
MCV RBC AUTO: 89.3 FL — SIGNIFICANT CHANGE UP (ref 80–100)
NRBC # BLD: 0 /100 WBCS — SIGNIFICANT CHANGE UP (ref 0–0)
PHOSPHATE SERPL-MCNC: 4.2 MG/DL — SIGNIFICANT CHANGE UP (ref 2.5–4.5)
PLATELET # BLD AUTO: 339 K/UL — SIGNIFICANT CHANGE UP (ref 150–400)
POTASSIUM SERPL-MCNC: 4 MMOL/L — SIGNIFICANT CHANGE UP (ref 3.5–5.3)
POTASSIUM SERPL-SCNC: 4 MMOL/L — SIGNIFICANT CHANGE UP (ref 3.5–5.3)
RBC # BLD: 4.5 M/UL — SIGNIFICANT CHANGE UP (ref 3.8–5.2)
RBC # FLD: 14 % — SIGNIFICANT CHANGE UP (ref 10.3–14.5)
SODIUM SERPL-SCNC: 139 MMOL/L — SIGNIFICANT CHANGE UP (ref 135–145)
WBC # BLD: 11.78 K/UL — HIGH (ref 3.8–10.5)
WBC # FLD AUTO: 11.78 K/UL — HIGH (ref 3.8–10.5)

## 2021-09-30 PROCEDURE — 99255 IP/OBS CONSLTJ NEW/EST HI 80: CPT | Mod: GC

## 2021-09-30 PROCEDURE — 99232 SBSQ HOSP IP/OBS MODERATE 35: CPT | Mod: GC

## 2021-09-30 RX ORDER — SENNA PLUS 8.6 MG/1
2 TABLET ORAL AT BEDTIME
Refills: 0 | Status: DISCONTINUED | OUTPATIENT
Start: 2021-09-30 | End: 2021-10-01

## 2021-09-30 RX ORDER — POLYETHYLENE GLYCOL 3350 17 G/17G
17 POWDER, FOR SOLUTION ORAL DAILY
Refills: 0 | Status: DISCONTINUED | OUTPATIENT
Start: 2021-09-30 | End: 2021-10-01

## 2021-09-30 RX ORDER — IPRATROPIUM/ALBUTEROL SULFATE 18-103MCG
3 AEROSOL WITH ADAPTER (GRAM) INHALATION EVERY 4 HOURS
Refills: 0 | Status: DISCONTINUED | OUTPATIENT
Start: 2021-09-30 | End: 2021-10-03

## 2021-09-30 RX ADMIN — ENOXAPARIN SODIUM 40 MILLIGRAM(S): 100 INJECTION SUBCUTANEOUS at 14:13

## 2021-09-30 RX ADMIN — Medication 5 MILLIGRAM(S): at 14:12

## 2021-09-30 RX ADMIN — Medication 100 MILLIGRAM(S): at 21:22

## 2021-09-30 RX ADMIN — Medication 3 MILLILITER(S): at 21:23

## 2021-09-30 RX ADMIN — Medication 100 MILLIGRAM(S): at 14:12

## 2021-09-30 RX ADMIN — Medication 3 MILLILITER(S): at 06:29

## 2021-09-30 RX ADMIN — Medication 3 MILLILITER(S): at 18:08

## 2021-09-30 RX ADMIN — Medication 100 MILLIGRAM(S): at 06:29

## 2021-09-30 RX ADMIN — Medication 3 MILLILITER(S): at 00:17

## 2021-09-30 RX ADMIN — Medication 3 MILLIGRAM(S): at 00:17

## 2021-09-30 RX ADMIN — Medication 3 MILLILITER(S): at 14:11

## 2021-09-30 RX ADMIN — Medication 60 MILLIGRAM(S): at 11:08

## 2021-09-30 RX ADMIN — Medication 60 MILLIGRAM(S): at 21:23

## 2021-09-30 NOTE — CONSULT NOTE ADULT - ATTENDING COMMENTS
Severe asthma exacerbation, improving on steroids and with mag. Comfortable this afternoon, but diffusely wheezing. Will need steroid taper and outpatient pulm follow up for consideration of biologics. Eos were 0 on admission w no steroids on board.

## 2021-09-30 NOTE — PROGRESS NOTE ADULT - PROBLEM SELECTOR PLAN 3
Fluids: NI  Electrolytes: Mg>2, K>4  Nutrition:  No IVF currently needed, replete lytes PRN  Prophylaxis: lovenox  Activity: AAT, OOBTC  GI: none  C: FC  Dispo: Admit to [facility] Fluids: none  Electrolytes: Mg>2, K>4  Nutrition: Regular diet  Prophylaxis: lovenox  Activity: AAT, OOBTC  Dispo: Home

## 2021-09-30 NOTE — CONSULT NOTE ADULT - SUBJECTIVE AND OBJECTIVE BOX
PULMONARY SERVICE INITIAL CONSULT NOTE    HPI:  Pt is a 56 year old female with a past medical history of asthma (last hospitalization 10 years ago, no hx if intubation) and anxiety, presenting with an 7 day hx of shortness of breath with associated cough. Pt reports her asthma triggered by allergies and that 8 days ago she traveled for work and slept at a hotel with feather pillows. Since then she has had shortness of breath with assc dry cough Pt was taking advair as usual and her duoneb up to 5 times a day with no relief. Pt finally came to ED because the shortness of breath was so bad she felt like was going to pass out.     ED course  Vitals: 98.5F, , 123/55, RR 20, 96% on 3LNC  Labs: WBC 13, glucose 204, covid negative  Imaging: CXR revealing hyperinflation   Interventions: Albuterol duoneb x9, Solumedrol x1 (27 Sep 2021 12:34)      REVIEW OF SYSTEMS:  Constitutional: No fever, weight loss or fatigue  Eyes: No eye pain, visual disturbances, or discharge  ENMT:  No difficulty hearing, tinnitus, vertigo; No sinus or throat pain  Neck: No pain, stiffness or neck swelling  Respiratory: see HPI  Cardiovascular: No chest pain, palpitations, dizziness or leg swelling  Gastrointestinal: No abdominal or epigastric pain. No nausea, vomiting or hematemesis; No diarrhea or constipation. No melena or hematochezia.  Genitourinary: No dysuria, frequency, hematuria or incontinence  Neurological: No headaches, memory loss, loss of strength, numbness or tremors  Skin: No itching, burning, rashes or lesions   Lymph Nodes: No enlarged glands  Endocrine: No heat or cold intolerance; No hair loss  Musculoskeletal: No joint pain or swelling; No muscle, back or extremity pain  Psychiatric: No depression, anxiety, mood swings or difficulty sleeping  Heme/Lymph: No easy bruising or bleeding gums  Allergy and Immunologic: No hives or eczema    PAST MEDICAL & SURGICAL HISTORY:  Asthma    No significant past surgical history        FAMILY HISTORY:  Family history of asthma (Mother)    Family hx of hypertension (Father, Sibling)        SOCIAL HISTORY:  Smoking Status: [ ] Current, [ ] Former, [ ] Never  Pack Years:    MEDICATIONS:  Pulmonary:  albuterol/ipratropium for Nebulization 3 milliLiter(s) Nebulizer every 4 hours  benzonatate 100 milliGRAM(s) Oral every 8 hours    Antimicrobials:    Anticoagulants:  enoxaparin Injectable 40 milliGRAM(s) SubCutaneous every 24 hours    Onc:    GI/:  polyethylene glycol 3350 17 Gram(s) Oral daily PRN  senna 2 Tablet(s) Oral at bedtime PRN    Endocrine:  dextrose 40% Gel 15 Gram(s) Oral once  dextrose 50% Injectable 25 Gram(s) IV Push once  dextrose 50% Injectable 12.5 Gram(s) IV Push once  dextrose 50% Injectable 25 Gram(s) IV Push once  glucagon  Injectable 1 milliGRAM(s) IntraMuscular once  insulin lispro (ADMELOG) corrective regimen sliding scale   SubCutaneous Before meals and at bedtime  predniSONE   Tablet 60 milliGRAM(s) Oral two times a day    Cardiac:    Other Medications:  acetaminophen   Tablet .. 650 milliGRAM(s) Oral every 6 hours PRN  dextrose 5%. 1000 milliLiter(s) IV Continuous <Continuous>  dextrose 5%. 1000 milliLiter(s) IV Continuous <Continuous>  influenza   Vaccine 0.5 milliLiter(s) IntraMuscular once  melatonin 3 milliGRAM(s) Oral at bedtime PRN  PARoxetine 5 milliGRAM(s) Oral every 24 hours      Allergies    No Known Allergies    Intolerances        Vital Signs Last 24 Hrs  T(C): 37.3 (30 Sep 2021 13:59), Max: 37.3 (30 Sep 2021 13:59)  T(F): 99.2 (30 Sep 2021 13:59), Max: 99.2 (30 Sep 2021 13:59)  HR: 96 (30 Sep 2021 13:59) (83 - 96)  BP: 128/72 (30 Sep 2021 13:59) (103/62 - 128/72)  BP(mean): --  RR: 18 (30 Sep 2021 13:59) (17 - 18)  SpO2: 92% (30 Sep 2021 13:59) (92% - 96%)        PHYSICAL EXAM:  Constitutional: WDWN  Head: NC/AT  EENT: PERRL, anicteric sclera; oropharynx clear, MMM  Neck: supple, no appreciable JVD  Respiratory: CTA B/L; no W/R/R  Cardiovascular: +S1/S2, RRR  Gastrointestinal: soft, NT/ND  Extremities: WWP; no edema, clubbing or cyanosis  Vascular: 2+ radial pulses B/L  Neurological: awake and alert; FELIX    LABS:      CBC Full  -  ( 30 Sep 2021 06:47 )  WBC Count : 11.78 K/uL  RBC Count : 4.50 M/uL  Hemoglobin : 13.6 g/dL  Hematocrit : 40.2 %  Platelet Count - Automated : 339 K/uL  Mean Cell Volume : 89.3 fl  Mean Cell Hemoglobin : 30.2 pg  Mean Cell Hemoglobin Concentration : 33.8 gm/dL  Auto Neutrophil # : x  Auto Lymphocyte # : x  Auto Monocyte # : x  Auto Eosinophil # : x  Auto Basophil # : x  Auto Neutrophil % : x  Auto Lymphocyte % : x  Auto Monocyte % : x  Auto Eosinophil % : x  Auto Basophil % : x    09-30    139  |  104  |  14  ----------------------------<  103<H>  4.0   |  26  |  0.70    Ca    9.8      30 Sep 2021 06:47  Phos  4.2     09-30  Mg     2.1     09-30                        RADIOLOGY & ADDITIONAL STUDIES: PULMONARY SERVICE INITIAL CONSULT NOTE    Per admission HPI:  Pt is a 56 year old female with a past medical history of asthma (last hospitalization 10 years ago, no hx if intubation) and anxiety, presenting with an 7 day hx of shortness of breath with associated cough. Pt reports her asthma triggered by allergies and that 8 days ago she traveled for work and slept at a hotel with feather pillows. Since then she has had shortness of breath with assc dry cough Pt was taking advair as usual and her duoneb up to 5 times a day with no relief. Pt finally came to ED because the shortness of breath was so bad she felt like was going to pass out.     ED course  Vitals: 98.5F, , 123/55, RR 20, 96% on 3LNC  Labs: WBC 13, glucose 204, covid negative  Imaging: CXR revealing hyperinflation   Interventions: Albuterol duoneb x9, Solumedrol x1 (27 Sep 2021 12:34)  ____  Pt corroborates the above history. Additionally,   Asthma since childhood; Last hospitalization for asthma was 10 years ago and has been on Advair diskus 500-50 for the past 10 years. She has never been intubated and asthma runs in her family (mother, brother). Sx began on 9/14 after sleeping on feather pillows at a hotel. Symptoms of wheeze, cough, chest tightness, shortness of breath persisted for several days using her abluterol inhaler 4-6x day and nebs 3x/day until she came to the ED and admitted on 9/27.     Many known environmental triggers and describes her asthma as allergic. She also has a history of post nasal drip which she used to take zyrtec and other antihistamines but noted they have stopped working well recently.   She had COVID in November 2020 and since then has had approx 3 asthma exacerbations requiring prednisone and z-packs (last pred/z-pack for bronchitis/asthma in July 2021). She usually requires high doses of pred up to 70mg and tapers over 10 or so days. She does not have a pulmonologist but given her recent exacerbations her PCP has made an appointment for her with a pulmonary doctor next week. She has not had PFTs in over 10 years. Has a peak flow device at home and her baseline is 325.     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 She doesnt smoke cigarettes but smokes marijuana everyday unless in exacerbation.     REVIEW OF SYSTEMS:  Constitutional: No fever, weight loss or fatigue  Eyes: No eye pain, visual disturbances, or discharge  ENMT:  No difficulty hearing, tinnitus, vertigo; No sinus or throat pain  Neck: No pain, stiffness or neck swelling  Respiratory: see HPI  Cardiovascular: No chest pain, palpitations, dizziness or leg swelling  Gastrointestinal: No abdominal or epigastric pain. No nausea, vomiting or hematemesis; No diarrhea or constipation. No melena or hematochezia.  Genitourinary: No dysuria, frequency, hematuria or incontinence  Neurological: No headaches, memory loss, loss of strength, numbness or tremors  Skin: No itching, burning, rashes or lesions   Lymph Nodes: No enlarged glands  Endocrine: No heat or cold intolerance; No hair loss  Musculoskeletal: No joint pain or swelling; No muscle, back or extremity pain  Psychiatric: No depression, anxiety, mood swings or difficulty sleeping  Heme/Lymph: No easy bruising or bleeding gums  Allergy and Immunologic: No hives or eczema    PAST MEDICAL & SURGICAL HISTORY:  Asthma    No significant past surgical history    FAMILY HISTORY:  Family history of asthma (Mother)    Family hx of hypertension (Father, Sibling)    SOCIAL HISTORY:  as above     MEDICATIONS:  Pulmonary:  albuterol/ipratropium for Nebulization 3 milliLiter(s) Nebulizer every 4 hours  benzonatate 100 milliGRAM(s) Oral every 8 hours    Antimicrobials:    Anticoagulants:  enoxaparin Injectable 40 milliGRAM(s) SubCutaneous every 24 hours    Onc:    GI/:  polyethylene glycol 3350 17 Gram(s) Oral daily PRN  senna 2 Tablet(s) Oral at bedtime PRN    Endocrine:  dextrose 40% Gel 15 Gram(s) Oral once  dextrose 50% Injectable 25 Gram(s) IV Push once  dextrose 50% Injectable 12.5 Gram(s) IV Push once  dextrose 50% Injectable 25 Gram(s) IV Push once  glucagon  Injectable 1 milliGRAM(s) IntraMuscular once  insulin lispro (ADMELOG) corrective regimen sliding scale   SubCutaneous Before meals and at bedtime  predniSONE   Tablet 60 milliGRAM(s) Oral two times a day    Cardiac:    Other Medications:  acetaminophen   Tablet .. 650 milliGRAM(s) Oral every 6 hours PRN  dextrose 5%. 1000 milliLiter(s) IV Continuous <Continuous>  dextrose 5%. 1000 milliLiter(s) IV Continuous <Continuous>  influenza   Vaccine 0.5 milliLiter(s) IntraMuscular once  melatonin 3 milliGRAM(s) Oral at bedtime PRN  PARoxetine 5 milliGRAM(s) Oral every 24 hours      Allergies    No Known Allergies    Vital Signs Last 24 Hrs  T(C): 37.3 (30 Sep 2021 13:59), Max: 37.3 (30 Sep 2021 13:59)  T(F): 99.2 (30 Sep 2021 13:59), Max: 99.2 (30 Sep 2021 13:59)  HR: 96 (30 Sep 2021 13:59) (83 - 96)  BP: 128/72 (30 Sep 2021 13:59) (103/62 - 128/72)  BP(mean): --  RR: 18 (30 Sep 2021 13:59) (17 - 18)  SpO2: 92% (30 Sep 2021 13:59) (92% - 96%)    PHYSICAL EXAM:  Constitutional: no acute distress, intermittent cough, on NC  Head: NC/AT  EENT: PERRL, anicteric sclera; oropharynx clear, MMM  Neck: supple, no appreciable JVD  Respiratory: diffuse wheeze b/l, adequate air movement,   Cardiovascular: +S1/S2, RRR  Gastrointestinal: soft, NT/ND  Extremities: WWP; no edema, clubbing or cyanosis  Vascular: 2+ radial pulses B/L  Neurological: awake and alert; FELIX    LABS:      CBC Full  -  ( 30 Sep 2021 06:47 )  WBC Count : 11.78 K/uL  RBC Count : 4.50 M/uL  Hemoglobin : 13.6 g/dL  Hematocrit : 40.2 %  Platelet Count - Automated : 339 K/uL  Mean Cell Volume : 89.3 fl  Mean Cell Hemoglobin : 30.2 pg  Mean Cell Hemoglobin Concentration : 33.8 gm/dL    09-30    139  |  104  |  14  ----------------------------<  103<H>  4.0   |  26  |  0.70    Ca    9.8      30 Sep 2021 06:47  Phos  4.2     09-30  Mg     2.1     09-30    RADIOLOGY & ADDITIONAL STUDIES:  9/27/2021 CXR reviewed ---hyperinflation otherwise clear

## 2021-09-30 NOTE — CONSULT NOTE ADULT - ASSESSMENT
Pt is a 56 year old female with a past medical history of asthma (last hospitalization 10 years ago, no hx if intubation, last exacerbation req pred 7/2021) and anxiety, presenting with hypoxic respiratory failure in the setting of asthma exacerbation.     Pt likely has moderate-severe persistent asthma and recently more frequent exacerbations s/p COVID infection. This exacerbation triggered by feather pillows. She is a non-cigarette smoker but does smoke marijuana regularly. Her typical symtpoms are cough, wheeze, shortness of breath and chest tightness. She has taken high dose advair diskus 500-50, albuterol prn, and nebs prn. Currently, receiving pred 60mg BID (just changed today from 60mg daily) and duonebs. She has no eosinophils on CBC w/ diff. Will check IgE level for allergic component and can add montelukast ?ABPA. States she is producing green sputum today--can also give azithromycin for possible bronchitis.      -pred 60mg daily--likely will need taper (TBD)   -give magnesium 2g   -send RVP  -send IgE level  -asked pt to ask family member to bring in her peak flow device to check (if not able will provide device tomorrow)  -start symbicort BID (as does not have her home advair)   -start azithromycin   -continue duonebs   -trial Montelukast daily   -wean O2 as tolerated   -trial antihistamine for post nasal drip if pt wishes  -outpatient pulmonary appt (has appt for next week at [facility] per pt)  -outpatient PFTs     Plan to be discussed tomorrow with attending.  Pt is a 56 year old female with a past medical history of asthma (last hospitalization 10 years ago, no hx if intubation, last exacerbation req pred 7/2021) and anxiety, presenting with hypoxic respiratory failure in the setting of asthma exacerbation.     Pt likely has moderate-severe persistent asthma and recently more frequent exacerbations s/p COVID infection. This exacerbation triggered by feather pillows. She is a non-cigarette smoker but does smoke marijuana regularly. Her typical symtpoms are cough, wheeze, shortness of breath and chest tightness. She has taken high dose advair diskus 500-50, albuterol prn, and nebs prn. Currently, receiving pred 60mg BID (just changed today from 60mg daily) and duonebs. She has no eosinophils on CBC w/ diff. Will check IgE level for allergic component and can add montelukast ?ABPA. States she is producing green sputum today--can also give azithromycin for possible bronchitis. Currently on NC 3L sat 93%.    #acute asthma exacerbation  #hypoxic respiratory failure     -pred 60mg daily--likely will need taper (TBD)   -give magnesium 2g   -send RVP  -send IgE level  -asked pt to ask family member to bring in her peak flow device to check (if not able will provide device tomorrow)  -start symbicort BID (as does not have her home advair)   -start azithromycin   -continue duonebs   -trial Montelukast daily   -wean O2 as tolerated   -trial antihistamine for post nasal drip if pt wishes  -outpatient pulmonary appt (has appt for next week at [facility] per pt)  -outpatient PFTs     Plan to be discussed tomorrow with attending.  Pt is a 56 year old female with a past medical history of asthma (last hospitalization 10 years ago, no hx if intubation, last exacerbation req pred 7/2021) and anxiety, presenting with hypoxic respiratory failure in the setting of asthma exacerbation.     Pt likely has moderate-severe persistent asthma and recently more frequent exacerbations s/p COVID infection. This exacerbation triggered by feather pillows. She is a non-cigarette smoker but does smoke marijuana regularly. Her typical symtpoms are cough, wheeze, shortness of breath and chest tightness. She has taken high dose advair diskus 500-50, albuterol prn, and nebs prn. Currently, receiving pred 60mg daily and duonebs. She has no eosinophils on CBC w/ diff. Patient has never had biologic therapy in the past and given severity she may benefit from this after this exacerbation resolves. Also given her severe seasonal allergies, allergy shots will likely benefit her as well. Will send IgE level and can also give azithromycin for possible bronchitis. Currently on NC 3L sat 93%.    #acute asthma exacerbation  #hypoxic respiratory failure     -Pred 60mg daily--likely will need taper given severity  -Given magnesium 2g run with improvement   -Send RVP  -Send IgE level  -Continue symbicort BID (as does not have her home advair)   -Start azithromycin   -Continue duonebs q4h  -Continue Montelukast daily   -wean O2 as tolerated   -Antihistamine for post nasal drip  -outpatient pulmonary appt (has appt for next week at [facility] per pt)  -outpatient PFTs

## 2021-09-30 NOTE — PROGRESS NOTE ADULT - PROBLEM SELECTOR PLAN 2
Hx of anxiety. Home meds paroxetine 5mg qd  - c/w paroxetine 5mg qd H/o of anxiety. Home med paroxetine 5mg qd  - c/w paroxetine 5mg qd

## 2021-09-30 NOTE — PROGRESS NOTE ADULT - PROBLEM SELECTOR PLAN 1
One week hx of SOB and cough in the setting of recent exposure to feathers. Pt endorses most of her asthma is exacerbated by allergies. Asthma since childhood. Last hospitalization for asthma 10 years ago w/ no hx intubation. Home meds albuterol nebs, advair 500.   - Prednisone 60mg for 5 days (today day 2)  - increase duoneb to q6hr  - currently sat 93-94% on RA but using NC intermittently for comfort  - continue to monitor respiratory status -Pt presented with 1 week hx of SOB and cough in the setting of recent exposure to feathers. Pt endorses most of her asthma is exacerbated by allergies. Asthma since childhood. Last hospitalization for asthma 10 years ago w/ no hx intubation. Home meds albuterol nebs, advair 500.   -Pt continues to have prominent wheeze and SOB   -Pt with solumedrol  mg in ED on 9/27, then prednisone 60mg BID. C/W prednisone 60 mg BID given pt not improving. Plan to reassess need daily and taper on d/c.  -Duonebs Q4hrs.  -Continue with 3 L NC and wean as tolerated

## 2021-09-30 NOTE — PROGRESS NOTE ADULT - SUBJECTIVE AND OBJECTIVE BOX
***INCOMPLETE****  OVERNIGHT EVENTS:    SUBJECTIVE / INTERVAL HPI: Patient seen and examined at bedside.     ROS: negative unless otherwise stated above.    VITAL SIGNS:  Vital Signs Last 24 Hrs  T(C): 37.1 (30 Sep 2021 06:15), Max: 37.2 (29 Sep 2021 15:53)  T(F): 98.7 (30 Sep 2021 06:15), Max: 99 (29 Sep 2021 15:53)  HR: 83 (30 Sep 2021 06:15) (83 - 102)  BP: 103/62 (30 Sep 2021 06:15) (103/62 - 120/59)  BP(mean): 83 (29 Sep 2021 15:40) (75 - 83)  RR: 17 (30 Sep 2021 06:15) (16 - 18)  SpO2: 94% (30 Sep 2021 06:15) (92% - 96%)    PHYSICAL EXAM:    General: In no apparent distress  HEENT: NC/AT; PERRL, anicteric sclera; MMM  Neck: supple  Cardiovascular: +S1/S2; RRR  Respiratory: CTA B/L; no W/R/R  Gastrointestinal: soft, NT/ND; +BSx4  Extremities: WWP; no edema, clubbing or cyanosis  Vascular: 2+ radial, DP/PT pulses B/L  Neurological: AAOx3; no focal deficits    MEDICATIONS:  MEDICATIONS  (STANDING):  albuterol/ipratropium for Nebulization 3 milliLiter(s) Nebulizer every 6 hours  benzonatate 100 milliGRAM(s) Oral every 8 hours  dextrose 40% Gel 15 Gram(s) Oral once  dextrose 5%. 1000 milliLiter(s) (50 mL/Hr) IV Continuous <Continuous>  dextrose 5%. 1000 milliLiter(s) (100 mL/Hr) IV Continuous <Continuous>  dextrose 50% Injectable 25 Gram(s) IV Push once  dextrose 50% Injectable 12.5 Gram(s) IV Push once  dextrose 50% Injectable 25 Gram(s) IV Push once  enoxaparin Injectable 40 milliGRAM(s) SubCutaneous every 24 hours  glucagon  Injectable 1 milliGRAM(s) IntraMuscular once  influenza   Vaccine 0.5 milliLiter(s) IntraMuscular once  insulin lispro (ADMELOG) corrective regimen sliding scale   SubCutaneous Before meals and at bedtime  PARoxetine 5 milliGRAM(s) Oral every 24 hours  polyethylene glycol 3350 17 Gram(s) Oral every 24 hours  predniSONE   Tablet 60 milliGRAM(s) Oral every 24 hours  senna 2 Tablet(s) Oral at bedtime    MEDICATIONS  (PRN):  acetaminophen   Tablet .. 650 milliGRAM(s) Oral every 6 hours PRN Temp greater or equal to 38.5C (101.3F), Mild Pain (1 - 3)  melatonin 3 milliGRAM(s) Oral at bedtime PRN Insomnia      ALLERGIES:  Allergies    No Known Allergies    Intolerances        LABS:                        13.6   11.78 )-----------( 339      ( 30 Sep 2021 06:47 )             40.2     09-30    139  |  104  |  14  ----------------------------<  103<H>  4.0   |  26  |  0.70    Ca    9.8      30 Sep 2021 06:47  Phos  4.2     09-30  Mg     2.1     09-30          CAPILLARY BLOOD GLUCOSE      POCT Blood Glucose.: 95 mg/dL (30 Sep 2021 08:16)      RADIOLOGY & ADDITIONAL TESTS: Reviewed. OVERNIGHT EVENTS: No acute events overnight.    SUBJECTIVE / INTERVAL HPI: Patient seen and examined at bedside. Pt continues to have SOB at rest and requiring 3 L NC (desat to 87 without it).     ROS: negative unless otherwise stated above.    VITAL SIGNS:  Vital Signs Last 24 Hrs  T(C): 37.1 (30 Sep 2021 06:15), Max: 37.2 (29 Sep 2021 15:53)  T(F): 98.7 (30 Sep 2021 06:15), Max: 99 (29 Sep 2021 15:53)  HR: 83 (30 Sep 2021 06:15) (83 - 102)  BP: 103/62 (30 Sep 2021 06:15) (103/62 - 120/59)  BP(mean): 83 (29 Sep 2021 15:40) (75 - 83)  RR: 17 (30 Sep 2021 06:15) (16 - 18)  SpO2: 94% (30 Sep 2021 06:15) (92% - 96%) on 3 L NC    PHYSICAL EXAM:  General: Restless  HEENT: NC/AT; PERRL, anicteric sclera; MMM  Neck: supple  Cardiovascular: +S1/S2; RRR  Respiratory: Prominent wheeze throughout  Gastrointestinal: soft, NT/ND; +BSx4  Extremities: WWP; no edema, clubbing or cyanosis  Vascular: 2+ radial, DP/PT pulses B/L  Neurological: AAOx3; no focal deficits    MEDICATIONS:  MEDICATIONS  (STANDING):  albuterol/ipratropium for Nebulization 3 milliLiter(s) Nebulizer every 6 hours  benzonatate 100 milliGRAM(s) Oral every 8 hours  dextrose 40% Gel 15 Gram(s) Oral once  dextrose 5%. 1000 milliLiter(s) (50 mL/Hr) IV Continuous <Continuous>  dextrose 5%. 1000 milliLiter(s) (100 mL/Hr) IV Continuous <Continuous>  dextrose 50% Injectable 25 Gram(s) IV Push once  dextrose 50% Injectable 12.5 Gram(s) IV Push once  dextrose 50% Injectable 25 Gram(s) IV Push once  enoxaparin Injectable 40 milliGRAM(s) SubCutaneous every 24 hours  glucagon  Injectable 1 milliGRAM(s) IntraMuscular once  influenza   Vaccine 0.5 milliLiter(s) IntraMuscular once  insulin lispro (ADMELOG) corrective regimen sliding scale   SubCutaneous Before meals and at bedtime  PARoxetine 5 milliGRAM(s) Oral every 24 hours  polyethylene glycol 3350 17 Gram(s) Oral every 24 hours  predniSONE   Tablet 60 milliGRAM(s) Oral every 24 hours  senna 2 Tablet(s) Oral at bedtime    MEDICATIONS  (PRN):  acetaminophen   Tablet .. 650 milliGRAM(s) Oral every 6 hours PRN Temp greater or equal to 38.5C (101.3F), Mild Pain (1 - 3)  melatonin 3 milliGRAM(s) Oral at bedtime PRN Insomnia      ALLERGIES:  Allergies    No Known Allergies    Intolerances        LABS:                        13.6   11.78 )-----------( 339      ( 30 Sep 2021 06:47 )             40.2     09-30    139  |  104  |  14  ----------------------------<  103<H>  4.0   |  26  |  0.70    Ca    9.8      30 Sep 2021 06:47  Phos  4.2     09-30  Mg     2.1     09-30          CAPILLARY BLOOD GLUCOSE      POCT Blood Glucose.: 95 mg/dL (30 Sep 2021 08:16)      RADIOLOGY & ADDITIONAL TESTS: Reviewed.

## 2021-10-01 LAB
ALBUMIN SERPL ELPH-MCNC: 4.2 G/DL — SIGNIFICANT CHANGE UP (ref 3.3–5)
ALP SERPL-CCNC: 69 U/L — SIGNIFICANT CHANGE UP (ref 40–120)
ALT FLD-CCNC: 18 U/L — SIGNIFICANT CHANGE UP (ref 10–45)
ANION GAP SERPL CALC-SCNC: 11 MMOL/L — SIGNIFICANT CHANGE UP (ref 5–17)
AST SERPL-CCNC: 13 U/L — SIGNIFICANT CHANGE UP (ref 10–40)
BASOPHILS # BLD AUTO: 0.01 K/UL — SIGNIFICANT CHANGE UP (ref 0–0.2)
BASOPHILS NFR BLD AUTO: 0.1 % — SIGNIFICANT CHANGE UP (ref 0–2)
BILIRUB SERPL-MCNC: 0.3 MG/DL — SIGNIFICANT CHANGE UP (ref 0.2–1.2)
BUN SERPL-MCNC: 16 MG/DL — SIGNIFICANT CHANGE UP (ref 7–23)
CALCIUM SERPL-MCNC: 10.2 MG/DL — SIGNIFICANT CHANGE UP (ref 8.4–10.5)
CHLORIDE SERPL-SCNC: 98 MMOL/L — SIGNIFICANT CHANGE UP (ref 96–108)
CO2 SERPL-SCNC: 27 MMOL/L — SIGNIFICANT CHANGE UP (ref 22–31)
CREAT SERPL-MCNC: 0.62 MG/DL — SIGNIFICANT CHANGE UP (ref 0.5–1.3)
EOSINOPHIL # BLD AUTO: 0 K/UL — SIGNIFICANT CHANGE UP (ref 0–0.5)
EOSINOPHIL NFR BLD AUTO: 0 % — SIGNIFICANT CHANGE UP (ref 0–6)
GLUCOSE BLDC GLUCOMTR-MCNC: 106 MG/DL — HIGH (ref 70–99)
GLUCOSE BLDC GLUCOMTR-MCNC: 124 MG/DL — HIGH (ref 70–99)
GLUCOSE BLDC GLUCOMTR-MCNC: 140 MG/DL — HIGH (ref 70–99)
GLUCOSE BLDC GLUCOMTR-MCNC: 206 MG/DL — HIGH (ref 70–99)
GLUCOSE SERPL-MCNC: 147 MG/DL — HIGH (ref 70–99)
HCT VFR BLD CALC: 41.8 % — SIGNIFICANT CHANGE UP (ref 34.5–45)
HGB BLD-MCNC: 13.6 G/DL — SIGNIFICANT CHANGE UP (ref 11.5–15.5)
IMM GRANULOCYTES NFR BLD AUTO: 0.5 % — SIGNIFICANT CHANGE UP (ref 0–1.5)
LYMPHOCYTES # BLD AUTO: 1.4 K/UL — SIGNIFICANT CHANGE UP (ref 1–3.3)
LYMPHOCYTES # BLD AUTO: 12.9 % — LOW (ref 13–44)
MCHC RBC-ENTMCNC: 29 PG — SIGNIFICANT CHANGE UP (ref 27–34)
MCHC RBC-ENTMCNC: 32.5 GM/DL — SIGNIFICANT CHANGE UP (ref 32–36)
MCV RBC AUTO: 89.1 FL — SIGNIFICANT CHANGE UP (ref 80–100)
MONOCYTES # BLD AUTO: 0.38 K/UL — SIGNIFICANT CHANGE UP (ref 0–0.9)
MONOCYTES NFR BLD AUTO: 3.5 % — SIGNIFICANT CHANGE UP (ref 2–14)
NEUTROPHILS # BLD AUTO: 9 K/UL — HIGH (ref 1.8–7.4)
NEUTROPHILS NFR BLD AUTO: 83 % — HIGH (ref 43–77)
NRBC # BLD: 0 /100 WBCS — SIGNIFICANT CHANGE UP (ref 0–0)
PLATELET # BLD AUTO: 350 K/UL — SIGNIFICANT CHANGE UP (ref 150–400)
POTASSIUM SERPL-MCNC: 4 MMOL/L — SIGNIFICANT CHANGE UP (ref 3.5–5.3)
POTASSIUM SERPL-SCNC: 4 MMOL/L — SIGNIFICANT CHANGE UP (ref 3.5–5.3)
PROT SERPL-MCNC: 7.2 G/DL — SIGNIFICANT CHANGE UP (ref 6–8.3)
RBC # BLD: 4.69 M/UL — SIGNIFICANT CHANGE UP (ref 3.8–5.2)
RBC # FLD: 13.5 % — SIGNIFICANT CHANGE UP (ref 10.3–14.5)
SODIUM SERPL-SCNC: 136 MMOL/L — SIGNIFICANT CHANGE UP (ref 135–145)
WBC # BLD: 10.84 K/UL — HIGH (ref 3.8–10.5)
WBC # FLD AUTO: 10.84 K/UL — HIGH (ref 3.8–10.5)

## 2021-10-01 PROCEDURE — 99232 SBSQ HOSP IP/OBS MODERATE 35: CPT | Mod: GC

## 2021-10-01 PROCEDURE — 99254 IP/OBS CNSLTJ NEW/EST MOD 60: CPT | Mod: GC

## 2021-10-01 PROCEDURE — 99222 1ST HOSP IP/OBS MODERATE 55: CPT

## 2021-10-01 RX ORDER — BUDESONIDE AND FORMOTEROL FUMARATE DIHYDRATE 160; 4.5 UG/1; UG/1
2 AEROSOL RESPIRATORY (INHALATION)
Refills: 0 | Status: DISCONTINUED | OUTPATIENT
Start: 2021-10-01 | End: 2021-10-03

## 2021-10-01 RX ORDER — AZITHROMYCIN 500 MG/1
500 TABLET, FILM COATED ORAL EVERY 24 HOURS
Refills: 0 | Status: COMPLETED | OUTPATIENT
Start: 2021-10-01 | End: 2021-10-03

## 2021-10-01 RX ORDER — SENNA PLUS 8.6 MG/1
2 TABLET ORAL AT BEDTIME
Refills: 0 | Status: DISCONTINUED | OUTPATIENT
Start: 2021-10-01 | End: 2021-10-03

## 2021-10-01 RX ORDER — POLYETHYLENE GLYCOL 3350 17 G/17G
17 POWDER, FOR SOLUTION ORAL EVERY 24 HOURS
Refills: 0 | Status: DISCONTINUED | OUTPATIENT
Start: 2021-10-01 | End: 2021-10-03

## 2021-10-01 RX ORDER — MAGNESIUM SULFATE 500 MG/ML
2 VIAL (ML) INJECTION ONCE
Refills: 0 | Status: COMPLETED | OUTPATIENT
Start: 2021-10-01 | End: 2021-10-01

## 2021-10-01 RX ORDER — MONTELUKAST 4 MG/1
10 TABLET, CHEWABLE ORAL DAILY
Refills: 0 | Status: DISCONTINUED | OUTPATIENT
Start: 2021-10-01 | End: 2021-10-01

## 2021-10-01 RX ADMIN — AZITHROMYCIN 500 MILLIGRAM(S): 500 TABLET, FILM COATED ORAL at 07:47

## 2021-10-01 RX ADMIN — Medication 5 MILLIGRAM(S): at 14:12

## 2021-10-01 RX ADMIN — Medication 3 MILLILITER(S): at 01:03

## 2021-10-01 RX ADMIN — Medication 100 MILLIGRAM(S): at 22:00

## 2021-10-01 RX ADMIN — Medication 60 MILLIGRAM(S): at 05:41

## 2021-10-01 RX ADMIN — Medication 3 MILLILITER(S): at 14:15

## 2021-10-01 RX ADMIN — Medication 3 MILLILITER(S): at 05:40

## 2021-10-01 RX ADMIN — Medication 3 MILLILITER(S): at 09:20

## 2021-10-01 RX ADMIN — Medication 4: at 09:12

## 2021-10-01 RX ADMIN — Medication 3 MILLILITER(S): at 22:01

## 2021-10-01 RX ADMIN — ENOXAPARIN SODIUM 40 MILLIGRAM(S): 100 INJECTION SUBCUTANEOUS at 14:15

## 2021-10-01 RX ADMIN — Medication 3 MILLILITER(S): at 17:35

## 2021-10-01 RX ADMIN — POLYETHYLENE GLYCOL 3350 17 GRAM(S): 17 POWDER, FOR SOLUTION ORAL at 15:29

## 2021-10-01 RX ADMIN — Medication 100 MILLIGRAM(S): at 14:48

## 2021-10-01 RX ADMIN — BUDESONIDE AND FORMOTEROL FUMARATE DIHYDRATE 2 PUFF(S): 160; 4.5 AEROSOL RESPIRATORY (INHALATION) at 18:35

## 2021-10-01 RX ADMIN — Medication 100 MILLIGRAM(S): at 05:41

## 2021-10-01 RX ADMIN — Medication 3 MILLIGRAM(S): at 22:02

## 2021-10-01 RX ADMIN — Medication 50 GRAM(S): at 07:21

## 2021-10-01 NOTE — PROGRESS NOTE ADULT - PROBLEM SELECTOR PLAN 1
-Pt presented with 1 week hx of SOB and cough in the setting of recent exposure to feathers. Pt endorses most of her asthma is exacerbated by allergies. Asthma since childhood. Last hospitalization for asthma 10 years ago w/ no hx intubation. Home meds albuterol nebs, advair 500.   -Pt continues to have prominent wheeze and SOB, requiring 2 L NC  -Pt with solumedrol  mg in ED on 9/27, then prednisone 60mg BID. Switched to prednisone 60 mg daily today. Plan to reassess need daily and taper on d/c.  -Duonebs Q4hrs standing.  -Continue with 2 L NC and wean as tolerated  -2 g IV mag x1  -Montelukast 10 mg oral daily started today  -Azithromycin 500mg oral daily for 3 days started today  -Symbicort BID started today  -F/u IgE and RVP sent today  -Pulm consult following -Pt presented with 1 week hx of SOB and cough in the setting of recent exposure to feathers. Pt endorses most of her asthma is exacerbated by allergies. Asthma since childhood. Last hospitalization for asthma 10 years ago w/ no hx intubation. Home meds albuterol nebs, advair 500.   -Pt continues to have prominent wheeze and SOB, requiring 2 L NC  -Pt with solumedrol  mg in ED on 9/27, then prednisone 60mg BID. Switched to prednisone 60 mg daily today. Plan to reassess need daily and taper on d/c.  -Duonebs Q4hrs standing.  -Continue with 2 L NC and wean as tolerated  -2 g IV mag x1  -Azithromycin 500mg oral daily for 3 days started today  -Symbicort BID started today  -Considering starting montelukast 10 mg oral daily   -F/u IgE and RVP sent today  -Pulm consult following

## 2021-10-01 NOTE — PROGRESS NOTE ADULT - ASSESSMENT
56 year old female with a pmhx of asthma and anxiety presenting with a 7 day hx of SOB and dry cough, admitted for asthma exacerbation.

## 2021-10-01 NOTE — PROGRESS NOTE ADULT - PROBLEM SELECTOR PLAN 3
Fluids: none  Electrolytes: Mg>2, K>4  Nutrition: Regular diet  Prophylaxis: lovenox  Activity: AAT, OOBTC  Dispo: Home

## 2021-10-02 LAB
ALBUMIN SERPL ELPH-MCNC: 3.8 G/DL — SIGNIFICANT CHANGE UP (ref 3.3–5)
ALP SERPL-CCNC: 64 U/L — SIGNIFICANT CHANGE UP (ref 40–120)
ALT FLD-CCNC: 15 U/L — SIGNIFICANT CHANGE UP (ref 10–45)
ANION GAP SERPL CALC-SCNC: 9 MMOL/L — SIGNIFICANT CHANGE UP (ref 5–17)
AST SERPL-CCNC: 11 U/L — SIGNIFICANT CHANGE UP (ref 10–40)
BASOPHILS # BLD AUTO: 0.04 K/UL — SIGNIFICANT CHANGE UP (ref 0–0.2)
BASOPHILS NFR BLD AUTO: 0.4 % — SIGNIFICANT CHANGE UP (ref 0–2)
BILIRUB SERPL-MCNC: 0.4 MG/DL — SIGNIFICANT CHANGE UP (ref 0.2–1.2)
BUN SERPL-MCNC: 23 MG/DL — SIGNIFICANT CHANGE UP (ref 7–23)
CALCIUM SERPL-MCNC: 9.6 MG/DL — SIGNIFICANT CHANGE UP (ref 8.4–10.5)
CHLORIDE SERPL-SCNC: 102 MMOL/L — SIGNIFICANT CHANGE UP (ref 96–108)
CO2 SERPL-SCNC: 27 MMOL/L — SIGNIFICANT CHANGE UP (ref 22–31)
CREAT SERPL-MCNC: 0.77 MG/DL — SIGNIFICANT CHANGE UP (ref 0.5–1.3)
EOSINOPHIL # BLD AUTO: 0.21 K/UL — SIGNIFICANT CHANGE UP (ref 0–0.5)
EOSINOPHIL NFR BLD AUTO: 1.9 % — SIGNIFICANT CHANGE UP (ref 0–6)
GLUCOSE BLDC GLUCOMTR-MCNC: 109 MG/DL — HIGH (ref 70–99)
GLUCOSE BLDC GLUCOMTR-MCNC: 110 MG/DL — HIGH (ref 70–99)
GLUCOSE BLDC GLUCOMTR-MCNC: 130 MG/DL — HIGH (ref 70–99)
GLUCOSE BLDC GLUCOMTR-MCNC: 93 MG/DL — SIGNIFICANT CHANGE UP (ref 70–99)
GLUCOSE SERPL-MCNC: 111 MG/DL — HIGH (ref 70–99)
HCT VFR BLD CALC: 39.6 % — SIGNIFICANT CHANGE UP (ref 34.5–45)
HGB BLD-MCNC: 13.3 G/DL — SIGNIFICANT CHANGE UP (ref 11.5–15.5)
IMM GRANULOCYTES NFR BLD AUTO: 0.6 % — SIGNIFICANT CHANGE UP (ref 0–1.5)
LYMPHOCYTES # BLD AUTO: 3.78 K/UL — HIGH (ref 1–3.3)
LYMPHOCYTES # BLD AUTO: 33.8 % — SIGNIFICANT CHANGE UP (ref 13–44)
MAGNESIUM SERPL-MCNC: 2 MG/DL — SIGNIFICANT CHANGE UP (ref 1.6–2.6)
MCHC RBC-ENTMCNC: 29.8 PG — SIGNIFICANT CHANGE UP (ref 27–34)
MCHC RBC-ENTMCNC: 33.6 GM/DL — SIGNIFICANT CHANGE UP (ref 32–36)
MCV RBC AUTO: 88.8 FL — SIGNIFICANT CHANGE UP (ref 80–100)
MONOCYTES # BLD AUTO: 0.87 K/UL — SIGNIFICANT CHANGE UP (ref 0–0.9)
MONOCYTES NFR BLD AUTO: 7.8 % — SIGNIFICANT CHANGE UP (ref 2–14)
NEUTROPHILS # BLD AUTO: 6.22 K/UL — SIGNIFICANT CHANGE UP (ref 1.8–7.4)
NEUTROPHILS NFR BLD AUTO: 55.5 % — SIGNIFICANT CHANGE UP (ref 43–77)
NRBC # BLD: 0 /100 WBCS — SIGNIFICANT CHANGE UP (ref 0–0)
PLATELET # BLD AUTO: 337 K/UL — SIGNIFICANT CHANGE UP (ref 150–400)
POTASSIUM SERPL-MCNC: 3.6 MMOL/L — SIGNIFICANT CHANGE UP (ref 3.5–5.3)
POTASSIUM SERPL-SCNC: 3.6 MMOL/L — SIGNIFICANT CHANGE UP (ref 3.5–5.3)
PROT SERPL-MCNC: 6.5 G/DL — SIGNIFICANT CHANGE UP (ref 6–8.3)
RAPID RVP RESULT: SIGNIFICANT CHANGE UP
RBC # BLD: 4.46 M/UL — SIGNIFICANT CHANGE UP (ref 3.8–5.2)
RBC # FLD: 13.7 % — SIGNIFICANT CHANGE UP (ref 10.3–14.5)
SARS-COV-2 RNA SPEC QL NAA+PROBE: SIGNIFICANT CHANGE UP
SODIUM SERPL-SCNC: 138 MMOL/L — SIGNIFICANT CHANGE UP (ref 135–145)
WBC # BLD: 11.19 K/UL — HIGH (ref 3.8–10.5)
WBC # FLD AUTO: 11.19 K/UL — HIGH (ref 3.8–10.5)

## 2021-10-02 PROCEDURE — 99233 SBSQ HOSP IP/OBS HIGH 50: CPT

## 2021-10-02 PROCEDURE — 71275 CT ANGIOGRAPHY CHEST: CPT | Mod: 26

## 2021-10-02 PROCEDURE — 99233 SBSQ HOSP IP/OBS HIGH 50: CPT | Mod: GC

## 2021-10-02 RX ADMIN — BUDESONIDE AND FORMOTEROL FUMARATE DIHYDRATE 2 PUFF(S): 160; 4.5 AEROSOL RESPIRATORY (INHALATION) at 18:17

## 2021-10-02 RX ADMIN — Medication 100 MILLIGRAM(S): at 06:35

## 2021-10-02 RX ADMIN — Medication 3 MILLILITER(S): at 10:06

## 2021-10-02 RX ADMIN — Medication 100 MILLIGRAM(S): at 14:29

## 2021-10-02 RX ADMIN — Medication 3 MILLILITER(S): at 02:18

## 2021-10-02 RX ADMIN — POLYETHYLENE GLYCOL 3350 17 GRAM(S): 17 POWDER, FOR SOLUTION ORAL at 15:37

## 2021-10-02 RX ADMIN — ENOXAPARIN SODIUM 40 MILLIGRAM(S): 100 INJECTION SUBCUTANEOUS at 14:29

## 2021-10-02 RX ADMIN — Medication 3 MILLILITER(S): at 14:29

## 2021-10-02 RX ADMIN — Medication 5 MILLIGRAM(S): at 14:30

## 2021-10-02 RX ADMIN — Medication 3 MILLILITER(S): at 22:28

## 2021-10-02 RX ADMIN — Medication 3 MILLILITER(S): at 18:17

## 2021-10-02 RX ADMIN — Medication 100 MILLIGRAM(S): at 22:22

## 2021-10-02 RX ADMIN — Medication 3 MILLILITER(S): at 06:35

## 2021-10-02 RX ADMIN — BUDESONIDE AND FORMOTEROL FUMARATE DIHYDRATE 2 PUFF(S): 160; 4.5 AEROSOL RESPIRATORY (INHALATION) at 06:36

## 2021-10-02 RX ADMIN — Medication 3 MILLIGRAM(S): at 22:23

## 2021-10-02 RX ADMIN — AZITHROMYCIN 500 MILLIGRAM(S): 500 TABLET, FILM COATED ORAL at 06:35

## 2021-10-02 RX ADMIN — Medication 60 MILLIGRAM(S): at 06:35

## 2021-10-02 NOTE — PROGRESS NOTE ADULT - SUBJECTIVE AND OBJECTIVE BOX
Subjective/Interval events:  No acute overnight events. Breathing improving. Denies fever/chills/chest pain. Ambulatory sat dropped to 88% on room air today    MEDICATIONS  (STANDING):  albuterol/ipratropium for Nebulization 3 milliLiter(s) Nebulizer every 4 hours  azithromycin   Tablet 500 milliGRAM(s) Oral every 24 hours  benzonatate 100 milliGRAM(s) Oral every 8 hours  budesonide  80 MICROgram(s)/formoterol 4.5 MICROgram(s) Inhaler 2 Puff(s) Inhalation two times a day  dextrose 40% Gel 15 Gram(s) Oral once  dextrose 5%. 1000 milliLiter(s) (50 mL/Hr) IV Continuous <Continuous>  dextrose 5%. 1000 milliLiter(s) (100 mL/Hr) IV Continuous <Continuous>  dextrose 50% Injectable 25 Gram(s) IV Push once  dextrose 50% Injectable 12.5 Gram(s) IV Push once  dextrose 50% Injectable 25 Gram(s) IV Push once  enoxaparin Injectable 40 milliGRAM(s) SubCutaneous every 24 hours  glucagon  Injectable 1 milliGRAM(s) IntraMuscular once  influenza   Vaccine 0.5 milliLiter(s) IntraMuscular once  insulin lispro (ADMELOG) corrective regimen sliding scale   SubCutaneous Before meals and at bedtime  PARoxetine 5 milliGRAM(s) Oral every 24 hours  polyethylene glycol 3350 17 Gram(s) Oral every 24 hours  predniSONE   Tablet 60 milliGRAM(s) Oral every 24 hours  senna 2 Tablet(s) Oral at bedtime    MEDICATIONS  (PRN):  acetaminophen   Tablet .. 650 milliGRAM(s) Oral every 6 hours PRN Temp greater or equal to 38.5C (101.3F), Mild Pain (1 - 3)  melatonin 3 milliGRAM(s) Oral at bedtime PRN Insomnia    Vital Signs Last 24 Hrs  T(C): 37.3 (02 Oct 2021 13:16), Max: 37.4 (01 Oct 2021 22:17)  T(F): 99.2 (02 Oct 2021 13:16), Max: 99.3 (01 Oct 2021 22:17)  HR: 110 (02 Oct 2021 12:46) (90 - 135)  BP: 135/65 (02 Oct 2021 12:46) (100/63 - 135/65)  BP(mean): --  RR: 19 (02 Oct 2021 12:46) (18 - 20)  SpO2: 92% (02 Oct 2021 12:46) (88% - 94%)    PHYSICAL EXAM:  GENERAL: pleasant, appropriate, no acute distress. Participating appropriately in interview  HEENT - NC/AT, EOMI, PERLLA, oropharynx clear without exudate or erythema, moist mucus membranes  NECK: Soft, supple, No JVD, no lymphadenopathy  CHEST/LUNG: diffuse expiratory wheezing bilaterally, on 2L O2. Normal work of breathing, not tachypneic  HEART: Regular rate and rhythm; No murmurs, rubs, or gallops, normal s1/s2. Warm and well-perfused  ABDOMEN: Soft, Nontender, Nondistended. Normoactive bowel sounds.  EXTREMITIES:  2+ Peripheral Pulses, No clubbing, cyanosis, or edema  NEURO:  awake, alert, oriented to person, place, time, and situation. Cranial nerves intact, no motor or sensory deficits. Moves all extremities.  SKIN: No rashes or lesions    LABS:  10-02    138  |  102  |  23  ----------------------------<  111<H>  3.6   |  27  |  0.77    Ca    9.6      02 Oct 2021 08:07  Mg     2.0     10-02    TPro  6.5  /  Alb  3.8  /  TBili  0.4  /  DBili  x   /  AST  11  /  ALT  15  /  AlkPhos  64  10-02                          13.3   11.19 )-----------( 337      ( 02 Oct 2021 08:07 )             39.6     RADIOLOGY & ADDITIONAL TESTS:  reviewed, none new

## 2021-10-02 NOTE — PROGRESS NOTE ADULT - ATTENDING COMMENTS
asthma exacerbation. continue with steroid taper, needs outpatient pulmonary f/u.
Wheezing and pulmonary reserve improved. Continue prednisone 60 mg and then start taper tomorrow, decrease nebulizers to Q 6h. Can transfer to [facility].
Wheezing no change in AM, better in PM. Continue daily prednisone with standing Q4h bronchodilators. Continue telemetry for risk of worsening pulmonary status.
agree with assessment and plan as documented by resident.   -patient with persistent wheezing and cough despite 60mg prednisone daily - will increase back to 60mg BID today and continue duoneb q4h - If unable to wean oxygen and clinical improvement today, will consult pulmonology team for further recommendations  -patient hyperinflated lungs on cxr - concerning for underlying copd - has had worsening exacerbations since hx of covid subjectively from patient
agree with assessment and plan as documented by resident.  -plan to continue 60mg prednisone qd until tomorrow as patient still requiring 3L NC ; wean o2  -OOB, IS  -will likely decrease to 40mg daily upon dc for 5 days   -will need close pcp and pulmonology follow up
agree with assessment and plan as documented by resident.

## 2021-10-02 NOTE — PROGRESS NOTE ADULT - PROBLEM SELECTOR PLAN 3
Fluids: none  Electrolytes: Mg>2, K>4  Nutrition: Regular diet  Prophylaxis: lovenox  Activity: AAT, OOBTC  Dispo: Home - if able to wean off O2 this weekend then can d/c this weekend, otherwise may need home O2

## 2021-10-02 NOTE — PROGRESS NOTE ADULT - ASSESSMENT
This is a 56F with a past medical history of asthma (last hospitalization 10 years ago, no hx of intubation, last exacerbation with pred 7/2021) and anxiety, presenting with hypoxic respiratory failure 22 severe asthma exacerbation.    #Acute severe asthma exacerbation   #Hypoxic respiratory failure    Patient has history of moderate-severe persistent asthma and has a recent exacerbation 7/2021 in the setting of COVID pneumonia. She has been on high dose Advair, albuterol and nebulizers. She has also been treated with biologic therapy in the past. Continue current management, she appears to be improved on prednisone 60mg.    Recommend the following:  - Continue prednisone 60mg daily PO with plan to taper  - Send RVP  - Send IgE level  - Continue symbicort BID  - Continue on azithromycin   - Continue duonebs q4h standing  - Continue montelukast daily   - wean O2 as tolerated, goal spo2 90% or above   - Antihistamine for post nasal drip  - outpatient pulmonary appt (has appt for next week at [facility] per pt)  - outpatient PFTs     Patient discussed with attending Dr. Moreau. Plan finalized when attending attestation completed.  This is a 56F with a past medical history of asthma (last hospitalization 10 years ago, no hx of intubation, last exacerbation with pred 7/2021) and anxiety, presenting with hypoxic respiratory failure 22 severe asthma exacerbation.    #Acute severe asthma exacerbation   #Hypoxic respiratory failure    Patient has history of moderate-severe persistent asthma and has a recent exacerbation 7/2021 in the setting of COVID pneumonia. She has been on high dose Advair, albuterol and nebulizers. She has also been treated with biologic therapy in the past. Continue current management, she appears to be improved on prednisone 60mg.    Recommend the following:  - Continue prednisone 60mg daily PO with plan to taper  - Send RVP  - Send IgE level  - Continue symbicort BID  - Continue on azithromycin   - Continue duonebs q4h standing  - Continue montelukast daily   - wean O2 as tolerated, goal spo2 90% or above   - Antihistamine for post nasal drip  - outpatient pulmonary appt (has appt for next week at [facility] per pt)  - outpatient PFTs     Patient discussed with attending Dr. Moreau.

## 2021-10-02 NOTE — PROGRESS NOTE ADULT - PROVIDER SPECIALTY LIST ADULT
Internal Medicine
Pulmonology
Internal Medicine
Hospitalist

## 2021-10-02 NOTE — PROGRESS NOTE ADULT - PROBLEM SELECTOR PLAN 1
-Pt presented with 1 week hx of SOB and cough in the setting of recent exposure to feathers. Pt endorses most of her asthma is exacerbated by allergies. Asthma since childhood. Last hospitalization for asthma 10 years ago w/ no hx intubation. Home meds albuterol nebs, advair 500.   -Pt continues to have prominent wheeze and SOB, requiring 2 L NC - ambulatory SpO2 88% today  -Pt with solumedrol  mg in ED on 9/27, then prednisone 60mg BID. Switched to prednisone 60 mg daily today. Plan to reassess need daily and taper on d/c.  -continue Duonebs Q4hrs standing.  -Continue with 2 L NC and wean as tolerated  -2 g IV mag x1  -Azithromycin 500mg oral daily for 3 days  -Symbicort BID   -Considering starting montelukast 10 mg oral daily   -Pulm consult following  -consider CTA chest r/o PE  -may need home O2

## 2021-10-02 NOTE — PROGRESS NOTE ADULT - SUBJECTIVE AND OBJECTIVE BOX
PULMONARY CONSULT SERVICE FOLLOW-UP NOTE    INTERVAL HPI:  Reviewed chart and overnight events; patient seen and examined at bedside.    MEDICATIONS:  Pulmonary:  albuterol/ipratropium for Nebulization 3 milliLiter(s) Nebulizer every 4 hours  benzonatate 100 milliGRAM(s) Oral every 8 hours  budesonide  80 MICROgram(s)/formoterol 4.5 MICROgram(s) Inhaler 2 Puff(s) Inhalation two times a day    Antimicrobials:  azithromycin   Tablet 500 milliGRAM(s) Oral every 24 hours    Anticoagulants:  enoxaparin Injectable 40 milliGRAM(s) SubCutaneous every 24 hours    Cardiac:      Allergies    No Known Allergies    Intolerances        Vital Signs Last 24 Hrs  T(C): 37.3 (02 Oct 2021 13:16), Max: 37.4 (01 Oct 2021 22:17)  T(F): 99.2 (02 Oct 2021 13:16), Max: 99.3 (01 Oct 2021 22:17)  HR: 110 (02 Oct 2021 12:46) (90 - 135)  BP: 135/65 (02 Oct 2021 12:46) (100/63 - 135/65)  BP(mean): --  RR: 19 (02 Oct 2021 12:46) (18 - 20)  SpO2: 92% (02 Oct 2021 12:46) (88% - 95%)        PHYSICAL EXAM:  Constitutional: WD  HEENT: NC/AT; PERRL, anicteric sclera; MMM  Neck: supple  Lymph: No supraclavical LAD or cervical chain LAD  Cardiovascular: +S1/S2, RRR  Respiratory: CTA B/L; no W/R/R  Gastrointestinal: soft, NT/ND  Extremities: WWP; no edema, clubbing or cyanosis  Vascular: 2+ radial and pedal pulses  Neurological: AAOx3; no focal deficits    LABS:      CBC Full  -  ( 02 Oct 2021 08:07 )  WBC Count : 11.19 K/uL  RBC Count : 4.46 M/uL  Hemoglobin : 13.3 g/dL  Hematocrit : 39.6 %  Platelet Count - Automated : 337 K/uL  Mean Cell Volume : 88.8 fl  Mean Cell Hemoglobin : 29.8 pg  Mean Cell Hemoglobin Concentration : 33.6 gm/dL  Auto Neutrophil # : 6.22 K/uL  Auto Lymphocyte # : 3.78 K/uL  Auto Monocyte # : 0.87 K/uL  Auto Eosinophil # : 0.21 K/uL  Auto Basophil # : 0.04 K/uL  Auto Neutrophil % : 55.5 %  Auto Lymphocyte % : 33.8 %  Auto Monocyte % : 7.8 %  Auto Eosinophil % : 1.9 %  Auto Basophil % : 0.4 %    10-02    138  |  102  |  23  ----------------------------<  111<H>  3.6   |  27  |  0.77    Ca    9.6      02 Oct 2021 08:07  Mg     2.0     10-02    TPro  6.5  /  Alb  3.8  /  TBili  0.4  /  DBili  x   /  AST  11  /  ALT  15  /  AlkPhos  64  10-02                      RADIOLOGY & ADDITIONAL STUDIES: PULMONARY CONSULT SERVICE FOLLOW-UP NOTE    INTERVAL HPI:  Reviewed chart and overnight events; patient seen and examined at bedside. Feels better today compared to day of admission. No wheezing. Upset she was told she has emphysema, which I explained is a CT scan finding.     MEDICATIONS:  Pulmonary:  albuterol/ipratropium for Nebulization 3 milliLiter(s) Nebulizer every 4 hours  benzonatate 100 milliGRAM(s) Oral every 8 hours  budesonide  80 MICROgram(s)/formoterol 4.5 MICROgram(s) Inhaler 2 Puff(s) Inhalation two times a day    Antimicrobials:  azithromycin   Tablet 500 milliGRAM(s) Oral every 24 hours    Anticoagulants:  enoxaparin Injectable 40 milliGRAM(s) SubCutaneous every 24 hours    Allergies  No Known Allergies    Intolerances    Vital Signs Last 24 Hrs  T(C): 37.3 (02 Oct 2021 13:16), Max: 37.4 (01 Oct 2021 22:17)  T(F): 99.2 (02 Oct 2021 13:16), Max: 99.3 (01 Oct 2021 22:17)  HR: 110 (02 Oct 2021 12:46) (90 - 135)  BP: 135/65 (02 Oct 2021 12:46) (100/63 - 135/65)  BP(mean): --  RR: 19 (02 Oct 2021 12:46) (18 - 20)  SpO2: 92% (02 Oct 2021 12:46) (88% - 95%)    PHYSICAL EXAM:  Constitutional: WD  HEENT: NC/AT; PERRL, anicteric sclera; MMM  Neck: supple  Lymph: No supraclavicular LAD or cervical chain LAD  Cardiovascular: +S1/S2, RRR  Respiratory: CTA B/L; no W/R/R  Gastrointestinal: soft, NT/ND  Extremities: WWP; no edema, clubbing or cyanosis  Vascular: 2+ radial and pedal pulses  Neurological: AAOx3; no focal deficits    LABS:  CBC Full  -  ( 02 Oct 2021 08:07 )  WBC Count : 11.19 K/uL  RBC Count : 4.46 M/uL  Hemoglobin : 13.3 g/dL  Hematocrit : 39.6 %  Platelet Count - Automated : 337 K/uL  Mean Cell Volume : 88.8 fl  Mean Cell Hemoglobin : 29.8 pg  Mean Cell Hemoglobin Concentration : 33.6 gm/dL  Auto Neutrophil # : 6.22 K/uL  Auto Lymphocyte # : 3.78 K/uL  Auto Monocyte # : 0.87 K/uL  Auto Eosinophil # : 0.21 K/uL  Auto Basophil # : 0.04 K/uL  Auto Neutrophil % : 55.5 %  Auto Lymphocyte % : 33.8 %  Auto Monocyte % : 7.8 %  Auto Eosinophil % : 1.9 %  Auto Basophil % : 0.4 %    10-02    138  |  102  |  23  ----------------------------<  111<H>  3.6   |  27  |  0.77    Ca    9.6      02 Oct 2021 08:07  Mg     2.0     10-02    TPro  6.5  /  Alb  3.8  /  TBili  0.4  /  DBili  x   /  AST  11  /  ALT  15  /  AlkPhos  64  10-02                      RADIOLOGY & ADDITIONAL STUDIES:

## 2021-10-03 VITALS
DIASTOLIC BLOOD PRESSURE: 69 MMHG | SYSTOLIC BLOOD PRESSURE: 119 MMHG | TEMPERATURE: 100 F | HEART RATE: 108 BPM | RESPIRATION RATE: 18 BRPM | OXYGEN SATURATION: 94 %

## 2021-10-03 LAB
ANION GAP SERPL CALC-SCNC: 12 MMOL/L — SIGNIFICANT CHANGE UP (ref 5–17)
ANISOCYTOSIS BLD QL: SLIGHT — SIGNIFICANT CHANGE UP
BASOPHILS # BLD AUTO: 0.12 K/UL — SIGNIFICANT CHANGE UP (ref 0–0.2)
BASOPHILS NFR BLD AUTO: 0.9 % — SIGNIFICANT CHANGE UP (ref 0–2)
BUN SERPL-MCNC: 17 MG/DL — SIGNIFICANT CHANGE UP (ref 7–23)
CALCIUM SERPL-MCNC: 9.6 MG/DL — SIGNIFICANT CHANGE UP (ref 8.4–10.5)
CHLORIDE SERPL-SCNC: 100 MMOL/L — SIGNIFICANT CHANGE UP (ref 96–108)
CO2 SERPL-SCNC: 25 MMOL/L — SIGNIFICANT CHANGE UP (ref 22–31)
CREAT SERPL-MCNC: 0.67 MG/DL — SIGNIFICANT CHANGE UP (ref 0.5–1.3)
EOSINOPHIL # BLD AUTO: 0.12 K/UL — SIGNIFICANT CHANGE UP (ref 0–0.5)
EOSINOPHIL NFR BLD AUTO: 0.9 % — SIGNIFICANT CHANGE UP (ref 0–6)
GIANT PLATELETS BLD QL SMEAR: PRESENT — SIGNIFICANT CHANGE UP
GLUCOSE BLDC GLUCOMTR-MCNC: 106 MG/DL — HIGH (ref 70–99)
GLUCOSE BLDC GLUCOMTR-MCNC: 129 MG/DL — HIGH (ref 70–99)
GLUCOSE SERPL-MCNC: 99 MG/DL — SIGNIFICANT CHANGE UP (ref 70–99)
HCT VFR BLD CALC: 39.2 % — SIGNIFICANT CHANGE UP (ref 34.5–45)
HGB BLD-MCNC: 12.9 G/DL — SIGNIFICANT CHANGE UP (ref 11.5–15.5)
HYPOCHROMIA BLD QL: SLIGHT — SIGNIFICANT CHANGE UP
LYMPHOCYTES # BLD AUTO: 37.2 % — SIGNIFICANT CHANGE UP (ref 13–44)
LYMPHOCYTES # BLD AUTO: 4.78 K/UL — HIGH (ref 1–3.3)
MACROCYTES BLD QL: SLIGHT — SIGNIFICANT CHANGE UP
MAGNESIUM SERPL-MCNC: 2 MG/DL — SIGNIFICANT CHANGE UP (ref 1.6–2.6)
MANUAL SMEAR VERIFICATION: SIGNIFICANT CHANGE UP
MCHC RBC-ENTMCNC: 29.1 PG — SIGNIFICANT CHANGE UP (ref 27–34)
MCHC RBC-ENTMCNC: 32.9 GM/DL — SIGNIFICANT CHANGE UP (ref 32–36)
MCV RBC AUTO: 88.5 FL — SIGNIFICANT CHANGE UP (ref 80–100)
MICROCYTES BLD QL: SLIGHT — SIGNIFICANT CHANGE UP
MONOCYTES # BLD AUTO: 1.25 K/UL — HIGH (ref 0–0.9)
MONOCYTES NFR BLD AUTO: 9.7 % — SIGNIFICANT CHANGE UP (ref 2–14)
NEUTROPHILS # BLD AUTO: 6.26 K/UL — SIGNIFICANT CHANGE UP (ref 1.8–7.4)
NEUTROPHILS NFR BLD AUTO: 48.7 % — SIGNIFICANT CHANGE UP (ref 43–77)
OVALOCYTES BLD QL SMEAR: SLIGHT — SIGNIFICANT CHANGE UP
PHOSPHATE SERPL-MCNC: 4 MG/DL — SIGNIFICANT CHANGE UP (ref 2.5–4.5)
PLAT MORPH BLD: ABNORMAL
PLATELET # BLD AUTO: 342 K/UL — SIGNIFICANT CHANGE UP (ref 150–400)
POIKILOCYTOSIS BLD QL AUTO: SLIGHT — SIGNIFICANT CHANGE UP
POLYCHROMASIA BLD QL SMEAR: SLIGHT — SIGNIFICANT CHANGE UP
POTASSIUM SERPL-MCNC: 3.8 MMOL/L — SIGNIFICANT CHANGE UP (ref 3.5–5.3)
POTASSIUM SERPL-SCNC: 3.8 MMOL/L — SIGNIFICANT CHANGE UP (ref 3.5–5.3)
RBC # BLD: 4.43 M/UL — SIGNIFICANT CHANGE UP (ref 3.8–5.2)
RBC # FLD: 13.7 % — SIGNIFICANT CHANGE UP (ref 10.3–14.5)
RBC BLD AUTO: ABNORMAL
SMUDGE CELLS # BLD: PRESENT — SIGNIFICANT CHANGE UP
SODIUM SERPL-SCNC: 137 MMOL/L — SIGNIFICANT CHANGE UP (ref 135–145)
SPHEROCYTES BLD QL SMEAR: SLIGHT — SIGNIFICANT CHANGE UP
VARIANT LYMPHS # BLD: 2.6 % — SIGNIFICANT CHANGE UP (ref 0–6)
WBC # BLD: 12.85 K/UL — HIGH (ref 3.8–10.5)
WBC # FLD AUTO: 12.85 K/UL — HIGH (ref 3.8–10.5)

## 2021-10-03 PROCEDURE — 80048 BASIC METABOLIC PNL TOTAL CA: CPT

## 2021-10-03 PROCEDURE — 87635 SARS-COV-2 COVID-19 AMP PRB: CPT

## 2021-10-03 PROCEDURE — 85025 COMPLETE CBC W/AUTO DIFF WBC: CPT

## 2021-10-03 PROCEDURE — 36415 COLL VENOUS BLD VENIPUNCTURE: CPT

## 2021-10-03 PROCEDURE — 82785 ASSAY OF IGE: CPT

## 2021-10-03 PROCEDURE — 80053 COMPREHEN METABOLIC PANEL: CPT

## 2021-10-03 PROCEDURE — 71045 X-RAY EXAM CHEST 1 VIEW: CPT

## 2021-10-03 PROCEDURE — 83036 HEMOGLOBIN GLYCOSYLATED A1C: CPT

## 2021-10-03 PROCEDURE — 94640 AIRWAY INHALATION TREATMENT: CPT

## 2021-10-03 PROCEDURE — 0225U NFCT DS DNA&RNA 21 SARSCOV2: CPT

## 2021-10-03 PROCEDURE — 71275 CT ANGIOGRAPHY CHEST: CPT

## 2021-10-03 PROCEDURE — 99239 HOSP IP/OBS DSCHRG MGMT >30: CPT | Mod: GC

## 2021-10-03 PROCEDURE — 85027 COMPLETE CBC AUTOMATED: CPT

## 2021-10-03 PROCEDURE — 99285 EMERGENCY DEPT VISIT HI MDM: CPT

## 2021-10-03 PROCEDURE — 84100 ASSAY OF PHOSPHORUS: CPT

## 2021-10-03 PROCEDURE — 96374 THER/PROPH/DIAG INJ IV PUSH: CPT

## 2021-10-03 PROCEDURE — 82962 GLUCOSE BLOOD TEST: CPT

## 2021-10-03 PROCEDURE — 86803 HEPATITIS C AB TEST: CPT

## 2021-10-03 PROCEDURE — 83735 ASSAY OF MAGNESIUM: CPT

## 2021-10-03 RX ORDER — MONTELUKAST 4 MG/1
1 TABLET, CHEWABLE ORAL
Qty: 30 | Refills: 0
Start: 2021-10-03 | End: 2021-11-01

## 2021-10-03 RX ADMIN — Medication 3 MILLIGRAM(S): at 05:48

## 2021-10-03 RX ADMIN — Medication 3 MILLILITER(S): at 05:48

## 2021-10-03 RX ADMIN — AZITHROMYCIN 500 MILLIGRAM(S): 500 TABLET, FILM COATED ORAL at 05:49

## 2021-10-03 RX ADMIN — Medication 5 MILLIGRAM(S): at 14:24

## 2021-10-03 RX ADMIN — Medication 3 MILLILITER(S): at 03:08

## 2021-10-03 RX ADMIN — Medication 100 MILLIGRAM(S): at 14:24

## 2021-10-03 RX ADMIN — Medication 3 MILLILITER(S): at 14:24

## 2021-10-03 RX ADMIN — Medication 60 MILLIGRAM(S): at 05:48

## 2021-10-03 RX ADMIN — Medication 100 MILLIGRAM(S): at 05:49

## 2021-10-03 RX ADMIN — Medication 3 MILLILITER(S): at 10:40

## 2021-10-03 RX ADMIN — BUDESONIDE AND FORMOTEROL FUMARATE DIHYDRATE 2 PUFF(S): 160; 4.5 AEROSOL RESPIRATORY (INHALATION) at 05:49

## 2021-10-03 RX ADMIN — ENOXAPARIN SODIUM 40 MILLIGRAM(S): 100 INJECTION SUBCUTANEOUS at 14:24

## 2021-10-03 NOTE — DISCHARGE NOTE NURSING/CASE MANAGEMENT/SOCIAL WORK - PATIENT PORTAL LINK FT
You can access the FollowMyHealth Patient Portal offered by [facility] by registering at the following website: [email or web address]. By joining [facility]’s FollowMyHealth portal, you will also be able to view your health information using other applications (apps) compatible with our system.

## 2021-10-03 NOTE — DISCHARGE NOTE NURSING/CASE MANAGEMENT/SOCIAL WORK - NSDCFUADDAPPT_GEN_ALL_CORE_FT
You have a primary care and pulmonology appointments scheduled for next week at [facility]. Please make sure to attend this appointments.

## 2021-10-06 LAB — IGE SERPL-ACNC: 1219 KU/L — HIGH

## 2021-10-07 DIAGNOSIS — J45.909 UNSPECIFIED ASTHMA, UNCOMPLICATED: ICD-10-CM

## 2021-10-07 DIAGNOSIS — J45.41 MODERATE PERSISTENT ASTHMA WITH (ACUTE) EXACERBATION: ICD-10-CM

## 2021-10-07 DIAGNOSIS — J45.901 UNSPECIFIED ASTHMA WITH (ACUTE) EXACERBATION: ICD-10-CM

## 2021-10-07 DIAGNOSIS — J96.01 ACUTE RESPIRATORY FAILURE WITH HYPOXIA: ICD-10-CM

## 2022-06-30 NOTE — PROGRESS NOTE ADULT - SUBJECTIVE AND OBJECTIVE BOX
. OVERNIGHT EVENTS: No acute events overnight    SUBJECTIVE / INTERVAL HPI: Patient seen and examined at bedside. Pt subjectively feels like she is improving, but continues to have SOB and cough.    ROS: negative unless otherwise stated above.    VITAL SIGNS:  Vital Signs Last 24 Hrs  T(C): 36.4 (01 Oct 2021 05:20), Max: 37.3 (30 Sep 2021 13:59)  T(F): 97.5 (01 Oct 2021 05:20), Max: 99.2 (30 Sep 2021 13:59)  HR: 89 (01 Oct 2021 05:20) (89 - 96)  BP: 120/72 (01 Oct 2021 05:20) (109/67 - 128/72)  RR: 19 (01 Oct 2021 05:20) (17 - 19)  SpO2: 93% (01 Oct 2021 05:20) (92% - 93%) on 2 L NC. Desat to 89 on RA.    PHYSICAL EXAM:  General: Restless, anxious  HEENT: NC/AT; PERRL, anicteric sclera; MMM  Neck: supple  Cardiovascular: +S1/S2; RRR  Respiratory: Wheeze throughout  Gastrointestinal: soft, NT/ND; +BSx4  Extremities: WWP; no edema, clubbing or cyanosis  Vascular: 2+ radial, DP/PT pulses B/L  Neurological: AAOx3; no focal deficits    MEDICATIONS:  MEDICATIONS  (STANDING):  albuterol/ipratropium for Nebulization 3 milliLiter(s) Nebulizer every 4 hours  azithromycin   Tablet 500 milliGRAM(s) Oral every 24 hours  benzonatate 100 milliGRAM(s) Oral every 8 hours  budesonide  80 MICROgram(s)/formoterol 4.5 MICROgram(s) Inhaler 2 Puff(s) Inhalation two times a day  dextrose 40% Gel 15 Gram(s) Oral once  dextrose 5%. 1000 milliLiter(s) (50 mL/Hr) IV Continuous <Continuous>  dextrose 5%. 1000 milliLiter(s) (100 mL/Hr) IV Continuous <Continuous>  dextrose 50% Injectable 25 Gram(s) IV Push once  dextrose 50% Injectable 12.5 Gram(s) IV Push once  dextrose 50% Injectable 25 Gram(s) IV Push once  enoxaparin Injectable 40 milliGRAM(s) SubCutaneous every 24 hours  glucagon  Injectable 1 milliGRAM(s) IntraMuscular once  influenza   Vaccine 0.5 milliLiter(s) IntraMuscular once  insulin lispro (ADMELOG) corrective regimen sliding scale   SubCutaneous Before meals and at bedtime  montelukast 10 milliGRAM(s) Oral daily  PARoxetine 5 milliGRAM(s) Oral every 24 hours    MEDICATIONS  (PRN):  acetaminophen   Tablet .. 650 milliGRAM(s) Oral every 6 hours PRN Temp greater or equal to 38.5C (101.3F), Mild Pain (1 - 3)  melatonin 3 milliGRAM(s) Oral at bedtime PRN Insomnia  polyethylene glycol 3350 17 Gram(s) Oral daily PRN Constipation  senna 2 Tablet(s) Oral at bedtime PRN Constipation      ALLERGIES:  Allergies    No Known Allergies    Intolerances        LABS:                        13.6   10.84 )-----------( 350      ( 01 Oct 2021 07:15 )             41.8     10-01    136  |  98  |  16  ----------------------------<  147<H>  4.0   |  27  |  0.62    Ca    10.2      01 Oct 2021 07:15  Phos  4.2     09-30  Mg     2.1     09-30    TPro  7.2  /  Alb  4.2  /  TBili  0.3  /  DBili  x   /  AST  13  /  ALT  18  /  AlkPhos  69  10-01        CAPILLARY BLOOD GLUCOSE      POCT Blood Glucose.: 104 mg/dL (30 Sep 2021 21:16)      RADIOLOGY & ADDITIONAL TESTS: Reviewed.

## 2025-02-06 NOTE — H&P ADULT - ASSESSMENT
Pt is a 56 year old female with a pmhx of asthma and anxiety presenting with a 7 day hx of SOB and dry cough likely in the setting of asthma exacerbation.  Normal